# Patient Record
Sex: MALE | Race: WHITE
[De-identification: names, ages, dates, MRNs, and addresses within clinical notes are randomized per-mention and may not be internally consistent; named-entity substitution may affect disease eponyms.]

---

## 2017-03-30 ENCOUNTER — HOSPITAL ENCOUNTER (OUTPATIENT)
Dept: HOSPITAL 83 - RAD | Age: 22
Discharge: HOME | End: 2017-03-30
Attending: CHIROPRACTOR
Payer: COMMERCIAL

## 2017-03-30 DIAGNOSIS — M22.2X2: ICD-10-CM

## 2017-03-30 DIAGNOSIS — M25.471: Primary | ICD-10-CM

## 2017-04-06 ENCOUNTER — HOSPITAL ENCOUNTER (OUTPATIENT)
Dept: HOSPITAL 83 - LAB | Age: 22
Discharge: HOME | End: 2017-04-06
Attending: FAMILY MEDICINE
Payer: COMMERCIAL

## 2017-04-06 DIAGNOSIS — E16.2: ICD-10-CM

## 2017-04-06 DIAGNOSIS — M25.50: ICD-10-CM

## 2017-04-06 DIAGNOSIS — R10.9: ICD-10-CM

## 2017-04-06 DIAGNOSIS — Z13.220: Primary | ICD-10-CM

## 2017-04-06 LAB
ALBUMIN SERPL-MCNC: 4.2 GM/DL (ref 3.1–4.5)
ALP SERPL-CCNC: 69 U/L (ref 45–117)
ALT SERPL W P-5'-P-CCNC: 40 U/L (ref 12–78)
AST SERPL-CCNC: 17 IU/L (ref 3–35)
BUN SERPL-MCNC: 14 MG/DL (ref 7–24)
CHLORIDE SERPL-SCNC: 105 MMOL/L (ref 98–107)
CHOLEST SERPL-MCNC: 104 MG/DL (ref ?–200)
CK SERPL-CCNC: 104 U/L (ref 39–308)
CO2 SERPL-SCNC: 28 MMOL/L (ref 21–32)
ERYTHROCYTE [DISTWIDTH] IN BLOOD BY AUTOMATED COUNT: 12.7 % (ref 0–14.5)
EST. AVERAGE GLUCOSE BLD GHB EST-MCNC: 97 MG/DL
GLUCOSE SERPL-MCNC: 100 MG/DL (ref 65–99)
HCT VFR BLD AUTO: 45.2 % (ref 42–52)
HDLC SERPL-MCNC: 52 MG/DL (ref 40–60)
HGB BLD-MCNC: 15.7 G/DL (ref 14–18)
LDLC SERPL DIRECT ASSAY-MCNC: 33 MG/DL (ref 9–159)
MCH RBC QN AUTO: 29.1 PG (ref 27–31)
MCHC RBC AUTO-ENTMCNC: 34.7 G/DL (ref 33–37)
MCV RBC AUTO: 83.9 FL (ref 80–94)
NRBC BLD QL AUTO: 0 10*3/UL (ref 0–0)
PLATELET # BLD AUTO: 98 10*3/UL (ref 130–400)
PMV BLD AUTO: 11.8 FL (ref 9.6–12.3)
POTASSIUM SERPL-SCNC: 3.8 MMOL/L (ref 3.5–5.1)
PROT SERPL-MCNC: 7.8 GM/DL (ref 6.4–8.2)
RBC # BLD AUTO: 5.39 10*6/UL (ref 4.5–5.9)
SODIUM SERPL-SCNC: 141 MMOL/L (ref 136–145)
TRIGL SERPL-MCNC: 97 MG/DL (ref ?–150)
VLDLC SERPL CALC-MCNC: 19 MG/DL (ref 6–40)
WBC NRBC COR # BLD AUTO: 7.2 10*3/UL (ref 4.8–10.8)

## 2017-04-07 LAB
LYME AB/TOTAL IMMUNOGLOBULINS: <0.91 ISR (ref 0–0.9)
RHEUMATOID FACT SERPL-ACNC: <10 IU/ML (ref 0–13.9)

## 2017-06-13 ENCOUNTER — HOSPITAL ENCOUNTER (EMERGENCY)
Dept: HOSPITAL 83 - ED | Age: 22
Discharge: HOME | End: 2017-06-13
Payer: COMMERCIAL

## 2017-06-13 VITALS — WEIGHT: 225 LBS | BODY MASS INDEX: 33.33 KG/M2 | HEIGHT: 68.98 IN

## 2017-06-13 VITALS — DIASTOLIC BLOOD PRESSURE: 64 MMHG
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
## 2017-06-13 DIAGNOSIS — D69.3: ICD-10-CM
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
## 2017-06-13 DIAGNOSIS — S86.912A: Primary | ICD-10-CM

## 2017-06-13 DIAGNOSIS — Z79.899: ICD-10-CM
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
## 2017-06-13 DIAGNOSIS — S99.922A: ICD-10-CM

## 2017-08-04 ENCOUNTER — HOSPITAL ENCOUNTER (EMERGENCY)
Dept: HOSPITAL 83 - ED | Age: 22
Discharge: HOME | End: 2017-08-04
Payer: COMMERCIAL

## 2017-08-04 VITALS — WEIGHT: 230 LBS | BODY MASS INDEX: 34.07 KG/M2 | HEIGHT: 68.98 IN

## 2017-08-04 VITALS — SYSTOLIC BLOOD PRESSURE: 125 MMHG | DIASTOLIC BLOOD PRESSURE: 87 MMHG
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
## 2017-08-04 DIAGNOSIS — S16.1XXA: Primary | ICD-10-CM
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
## 2018-07-31 ENCOUNTER — HOSPITAL ENCOUNTER (OUTPATIENT)
Dept: HOSPITAL 83 - RAD | Age: 23
Discharge: HOME | End: 2018-07-31
Attending: FAMILY MEDICINE
Payer: COMMERCIAL

## 2018-07-31 DIAGNOSIS — M79.671: Primary | ICD-10-CM

## 2020-05-30 ENCOUNTER — APPOINTMENT (OUTPATIENT)
Dept: GENERAL RADIOLOGY | Age: 25
DRG: 515 | End: 2020-05-30
Payer: COMMERCIAL

## 2020-05-30 ENCOUNTER — APPOINTMENT (OUTPATIENT)
Dept: CT IMAGING | Age: 25
DRG: 515 | End: 2020-05-30
Payer: COMMERCIAL

## 2020-05-30 ENCOUNTER — HOSPITAL ENCOUNTER (INPATIENT)
Age: 25
LOS: 2 days | Discharge: HOME | DRG: 515 | End: 2020-06-01
Attending: EMERGENCY MEDICINE | Admitting: SURGERY
Payer: COMMERCIAL

## 2020-05-30 PROBLEM — T14.90XA TRAUMA: Status: ACTIVE | Noted: 2020-05-30

## 2020-05-30 LAB
ABO/RH: NORMAL
ACETAMINOPHEN LEVEL: <5 MCG/ML (ref 10–30)
ALBUMIN SERPL-MCNC: 4.6 G/DL (ref 3.5–5.2)
ALP BLD-CCNC: 59 U/L (ref 40–129)
ALT SERPL-CCNC: 34 U/L (ref 0–40)
ANION GAP SERPL CALCULATED.3IONS-SCNC: 16 MMOL/L (ref 7–16)
ANTIBODY SCREEN: NORMAL
APTT: 28 SEC (ref 24.5–35.1)
AST SERPL-CCNC: 27 U/L (ref 0–39)
B.E.: -2 MMOL/L (ref -3–3)
BILIRUB SERPL-MCNC: 0.4 MG/DL (ref 0–1.2)
BUN BLDV-MCNC: 12 MG/DL (ref 6–20)
CALCIUM SERPL-MCNC: 9 MG/DL (ref 8.6–10.2)
CHLORIDE BLD-SCNC: 102 MMOL/L (ref 98–107)
CO2: 23 MMOL/L (ref 22–29)
COHB: 0.4 % (ref 0–1.5)
CREAT SERPL-MCNC: 1 MG/DL (ref 0.7–1.2)
CRITICAL: ABNORMAL
DATE ANALYZED: ABNORMAL
DATE OF COLLECTION: ABNORMAL
ETHANOL: 72 MG/DL (ref 0–0.08)
GFR AFRICAN AMERICAN: >60
GFR NON-AFRICAN AMERICAN: >60 ML/MIN/1.73
GLUCOSE BLD-MCNC: 108 MG/DL (ref 74–99)
HCO3: 21.2 MMOL/L (ref 22–26)
HCT VFR BLD CALC: 46.9 % (ref 37–54)
HEMOGLOBIN: 16.1 G/DL (ref 12.5–16.5)
HHB: 0.8 % (ref 0–5)
INR BLD: 1
LAB: ABNORMAL
LACTIC ACID: 2.8 MMOL/L (ref 0.5–2.2)
Lab: ABNORMAL
MCH RBC QN AUTO: 29.4 PG (ref 26–35)
MCHC RBC AUTO-ENTMCNC: 34.3 % (ref 32–34.5)
MCV RBC AUTO: 85.7 FL (ref 80–99.9)
METHB: 0.4 % (ref 0–1.5)
MODE: ABNORMAL
O2 CONTENT: 23.1 ML/DL
O2 SATURATION: 99.2 % (ref 92–98.5)
O2HB: 98.4 % (ref 94–97)
OPERATOR ID: 366
PATIENT TEMP: 37 C
PCO2: 32.3 MMHG (ref 35–45)
PDW BLD-RTO: 12.3 FL (ref 11.5–15)
PH BLOOD GAS: 7.43 (ref 7.35–7.45)
PLATELET # BLD: 144 E9/L (ref 130–450)
PMV BLD AUTO: 11.6 FL (ref 7–12)
PO2: 348.8 MMHG (ref 75–100)
POTASSIUM SERPL-SCNC: 3.81 MMOL/L (ref 3.5–5)
POTASSIUM SERPL-SCNC: 3.9 MMOL/L (ref 3.5–5)
PROTHROMBIN TIME: 11.4 SEC (ref 9.3–12.4)
RBC # BLD: 5.47 E12/L (ref 3.8–5.8)
SALICYLATE, SERUM: <0.3 MG/DL (ref 0–30)
SODIUM BLD-SCNC: 141 MMOL/L (ref 132–146)
SOURCE, BLOOD GAS: ABNORMAL
THB: 16.1 G/DL (ref 11.5–16.5)
TIME ANALYZED: 2150
TOTAL PROTEIN: 7.7 G/DL (ref 6.4–8.3)
TRICYCLIC ANTIDEPRESSANTS SCREEN SERUM: NEGATIVE NG/ML
WBC # BLD: 12.6 E9/L (ref 4.5–11.5)

## 2020-05-30 PROCEDURE — 71045 X-RAY EXAM CHEST 1 VIEW: CPT

## 2020-05-30 PROCEDURE — 73130 X-RAY EXAM OF HAND: CPT

## 2020-05-30 PROCEDURE — 6360000002 HC RX W HCPCS: Performed by: SURGERY

## 2020-05-30 PROCEDURE — 6810039000 HC L1 TRAUMA ALERT

## 2020-05-30 PROCEDURE — 99223 1ST HOSP IP/OBS HIGH 75: CPT | Performed by: SURGERY

## 2020-05-30 PROCEDURE — 99254 IP/OBS CNSLTJ NEW/EST MOD 60: CPT | Performed by: ORTHOPAEDIC SURGERY

## 2020-05-30 PROCEDURE — 85730 THROMBOPLASTIN TIME PARTIAL: CPT

## 2020-05-30 PROCEDURE — 85610 PROTHROMBIN TIME: CPT

## 2020-05-30 PROCEDURE — 2060000000 HC ICU INTERMEDIATE R&B

## 2020-05-30 PROCEDURE — 73000 X-RAY EXAM OF COLLAR BONE: CPT

## 2020-05-30 PROCEDURE — 85027 COMPLETE CBC AUTOMATED: CPT

## 2020-05-30 PROCEDURE — 86850 RBC ANTIBODY SCREEN: CPT

## 2020-05-30 PROCEDURE — 12001 RPR S/N/AX/GEN/TRNK 2.5CM/<: CPT

## 2020-05-30 PROCEDURE — 72125 CT NECK SPINE W/O DYE: CPT

## 2020-05-30 PROCEDURE — 6360000002 HC RX W HCPCS: Performed by: STUDENT IN AN ORGANIZED HEALTH CARE EDUCATION/TRAINING PROGRAM

## 2020-05-30 PROCEDURE — 80307 DRUG TEST PRSMV CHEM ANLYZR: CPT

## 2020-05-30 PROCEDURE — 83605 ASSAY OF LACTIC ACID: CPT

## 2020-05-30 PROCEDURE — 72170 X-RAY EXAM OF PELVIS: CPT

## 2020-05-30 PROCEDURE — 96374 THER/PROPH/DIAG INJ IV PUSH: CPT

## 2020-05-30 PROCEDURE — 84132 ASSAY OF SERUM POTASSIUM: CPT

## 2020-05-30 PROCEDURE — 73562 X-RAY EXAM OF KNEE 3: CPT

## 2020-05-30 PROCEDURE — 6360000004 HC RX CONTRAST MEDICATION: Performed by: RADIOLOGY

## 2020-05-30 PROCEDURE — 26750 TREAT FINGER FRACTURE EACH: CPT | Performed by: ORTHOPAEDIC SURGERY

## 2020-05-30 PROCEDURE — 86900 BLOOD TYPING SEROLOGIC ABO: CPT

## 2020-05-30 PROCEDURE — 74177 CT ABD & PELVIS W/CONTRAST: CPT

## 2020-05-30 PROCEDURE — 0JQ03ZZ REPAIR SCALP SUBCUTANEOUS TISSUE AND FASCIA, PERCUTANEOUS APPROACH: ICD-10-PCS | Performed by: STUDENT IN AN ORGANIZED HEALTH CARE EDUCATION/TRAINING PROGRAM

## 2020-05-30 PROCEDURE — 96375 TX/PRO/DX INJ NEW DRUG ADDON: CPT

## 2020-05-30 PROCEDURE — 86901 BLOOD TYPING SEROLOGIC RH(D): CPT

## 2020-05-30 PROCEDURE — 71260 CT THORAX DX C+: CPT

## 2020-05-30 PROCEDURE — 70450 CT HEAD/BRAIN W/O DYE: CPT

## 2020-05-30 PROCEDURE — 36415 COLL VENOUS BLD VENIPUNCTURE: CPT

## 2020-05-30 PROCEDURE — G0480 DRUG TEST DEF 1-7 CLASSES: HCPCS

## 2020-05-30 PROCEDURE — 99285 EMERGENCY DEPT VISIT HI MDM: CPT

## 2020-05-30 PROCEDURE — 80053 COMPREHEN METABOLIC PANEL: CPT

## 2020-05-30 PROCEDURE — 2500000003 HC RX 250 WO HCPCS: Performed by: STUDENT IN AN ORGANIZED HEALTH CARE EDUCATION/TRAINING PROGRAM

## 2020-05-30 PROCEDURE — 82805 BLOOD GASES W/O2 SATURATION: CPT

## 2020-05-30 PROCEDURE — 2580000003 HC RX 258: Performed by: STUDENT IN AN ORGANIZED HEALTH CARE EDUCATION/TRAINING PROGRAM

## 2020-05-30 RX ORDER — LIDOCAINE HYDROCHLORIDE AND EPINEPHRINE 10; 10 MG/ML; UG/ML
20 INJECTION, SOLUTION INFILTRATION; PERINEURAL ONCE
Status: COMPLETED | OUTPATIENT
Start: 2020-05-30 | End: 2020-05-30

## 2020-05-30 RX ORDER — SODIUM CHLORIDE 9 MG/ML
1000 INJECTION, SOLUTION INTRAVENOUS ONCE
Status: COMPLETED | OUTPATIENT
Start: 2020-05-30 | End: 2020-05-30

## 2020-05-30 RX ORDER — LEVETIRACETAM 5 MG/ML
500 INJECTION INTRAVASCULAR EVERY 12 HOURS
Status: DISCONTINUED | OUTPATIENT
Start: 2020-05-30 | End: 2020-05-31

## 2020-05-30 RX ORDER — FENTANYL CITRATE 50 UG/ML
INJECTION, SOLUTION INTRAMUSCULAR; INTRAVENOUS DAILY PRN
Status: COMPLETED | OUTPATIENT
Start: 2020-05-30 | End: 2020-05-30

## 2020-05-30 RX ORDER — FENTANYL CITRATE 50 UG/ML
INJECTION, SOLUTION INTRAMUSCULAR; INTRAVENOUS
Status: DISPENSED
Start: 2020-05-30 | End: 2020-05-31

## 2020-05-30 RX ADMIN — FENTANYL CITRATE 50 MCG: 50 INJECTION, SOLUTION INTRAMUSCULAR; INTRAVENOUS at 21:48

## 2020-05-30 RX ADMIN — LIDOCAINE HYDROCHLORIDE,EPINEPHRINE BITARTRATE 20 ML: 10; .01 INJECTION, SOLUTION INFILTRATION; PERINEURAL at 22:30

## 2020-05-30 RX ADMIN — SODIUM CHLORIDE 1000 ML: 9 INJECTION, SOLUTION INTRAVENOUS at 23:00

## 2020-05-30 RX ADMIN — LEVETIRACETAM 500 MG: 5 INJECTION INTRAVENOUS at 23:00

## 2020-05-30 RX ADMIN — IOPAMIDOL 110 ML: 755 INJECTION, SOLUTION INTRAVENOUS at 22:20

## 2020-05-30 ASSESSMENT — PAIN SCALES - GENERAL: PAINLEVEL_OUTOF10: 0

## 2020-05-31 ENCOUNTER — ANESTHESIA EVENT (OUTPATIENT)
Dept: OPERATING ROOM | Age: 25
DRG: 515 | End: 2020-05-31
Payer: COMMERCIAL

## 2020-05-31 ENCOUNTER — ANESTHESIA (OUTPATIENT)
Dept: OPERATING ROOM | Age: 25
DRG: 515 | End: 2020-05-31
Payer: COMMERCIAL

## 2020-05-31 ENCOUNTER — APPOINTMENT (OUTPATIENT)
Dept: GENERAL RADIOLOGY | Age: 25
DRG: 515 | End: 2020-05-31
Payer: COMMERCIAL

## 2020-05-31 ENCOUNTER — APPOINTMENT (OUTPATIENT)
Dept: CT IMAGING | Age: 25
DRG: 515 | End: 2020-05-31
Payer: COMMERCIAL

## 2020-05-31 VITALS — TEMPERATURE: 97.9 F | DIASTOLIC BLOOD PRESSURE: 95 MMHG | SYSTOLIC BLOOD PRESSURE: 137 MMHG | OXYGEN SATURATION: 92 %

## 2020-05-31 PROBLEM — S62.522A: Status: ACTIVE | Noted: 2020-05-31

## 2020-05-31 PROBLEM — S42.001A CLOSED DISPLACED FRACTURE OF RIGHT CLAVICLE: Status: ACTIVE | Noted: 2020-05-31

## 2020-05-31 PROBLEM — I60.9 SAH (SUBARACHNOID HEMORRHAGE) (HCC): Status: ACTIVE | Noted: 2020-05-31

## 2020-05-31 LAB
ANION GAP SERPL CALCULATED.3IONS-SCNC: 15 MMOL/L (ref 7–16)
BUN BLDV-MCNC: 15 MG/DL (ref 6–20)
CALCIUM SERPL-MCNC: 8.7 MG/DL (ref 8.6–10.2)
CHLORIDE BLD-SCNC: 102 MMOL/L (ref 98–107)
CO2: 22 MMOL/L (ref 22–29)
CREAT SERPL-MCNC: 0.8 MG/DL (ref 0.7–1.2)
GFR AFRICAN AMERICAN: >60
GFR NON-AFRICAN AMERICAN: >60 ML/MIN/1.73
GLUCOSE BLD-MCNC: 133 MG/DL (ref 74–99)
HCT VFR BLD CALC: 44.2 % (ref 37–54)
HEMOGLOBIN: 14.9 G/DL (ref 12.5–16.5)
LACTIC ACID: 2.2 MMOL/L (ref 0.5–2.2)
MCH RBC QN AUTO: 29 PG (ref 26–35)
MCHC RBC AUTO-ENTMCNC: 33.7 % (ref 32–34.5)
MCV RBC AUTO: 86.2 FL (ref 80–99.9)
PDW BLD-RTO: 12.5 FL (ref 11.5–15)
PLATELET # BLD: 128 E9/L (ref 130–450)
PMV BLD AUTO: 11.5 FL (ref 7–12)
POTASSIUM REFLEX MAGNESIUM: 4 MMOL/L (ref 3.5–5)
RBC # BLD: 5.13 E12/L (ref 3.8–5.8)
SARS-COV-2, NAAT: NOT DETECTED
SODIUM BLD-SCNC: 139 MMOL/L (ref 132–146)
WBC # BLD: 14.8 E9/L (ref 4.5–11.5)

## 2020-05-31 PROCEDURE — C1713 ANCHOR/SCREW BN/BN,TIS/BN: HCPCS | Performed by: ORTHOPAEDIC SURGERY

## 2020-05-31 PROCEDURE — 2709999900 HC NON-CHARGEABLE SUPPLY: Performed by: ORTHOPAEDIC SURGERY

## 2020-05-31 PROCEDURE — 6360000002 HC RX W HCPCS: Performed by: STUDENT IN AN ORGANIZED HEALTH CARE EDUCATION/TRAINING PROGRAM

## 2020-05-31 PROCEDURE — 2580000003 HC RX 258: Performed by: RADIOLOGY

## 2020-05-31 PROCEDURE — 3700000000 HC ANESTHESIA ATTENDED CARE: Performed by: ORTHOPAEDIC SURGERY

## 2020-05-31 PROCEDURE — 2700000000 HC OXYGEN THERAPY PER DAY

## 2020-05-31 PROCEDURE — 6370000000 HC RX 637 (ALT 250 FOR IP): Performed by: STUDENT IN AN ORGANIZED HEALTH CARE EDUCATION/TRAINING PROGRAM

## 2020-05-31 PROCEDURE — 3700000001 HC ADD 15 MINUTES (ANESTHESIA): Performed by: ORTHOPAEDIC SURGERY

## 2020-05-31 PROCEDURE — 3209999900 FLUORO FOR SURGICAL PROCEDURES

## 2020-05-31 PROCEDURE — 6360000002 HC RX W HCPCS

## 2020-05-31 PROCEDURE — 6370000000 HC RX 637 (ALT 250 FOR IP): Performed by: SURGERY

## 2020-05-31 PROCEDURE — 3600000003 HC SURGERY LEVEL 3 BASE: Performed by: ORTHOPAEDIC SURGERY

## 2020-05-31 PROCEDURE — 6360000002 HC RX W HCPCS: Performed by: ANESTHESIOLOGY

## 2020-05-31 PROCEDURE — 2500000003 HC RX 250 WO HCPCS: Performed by: ORTHOPAEDIC SURGERY

## 2020-05-31 PROCEDURE — 2500000003 HC RX 250 WO HCPCS

## 2020-05-31 PROCEDURE — 80048 BASIC METABOLIC PNL TOTAL CA: CPT

## 2020-05-31 PROCEDURE — 3600000013 HC SURGERY LEVEL 3 ADDTL 15MIN: Performed by: ORTHOPAEDIC SURGERY

## 2020-05-31 PROCEDURE — U0002 COVID-19 LAB TEST NON-CDC: HCPCS

## 2020-05-31 PROCEDURE — 2580000003 HC RX 258: Performed by: STUDENT IN AN ORGANIZED HEALTH CARE EDUCATION/TRAINING PROGRAM

## 2020-05-31 PROCEDURE — 2060000000 HC ICU INTERMEDIATE R&B

## 2020-05-31 PROCEDURE — 36415 COLL VENOUS BLD VENIPUNCTURE: CPT

## 2020-05-31 PROCEDURE — 0PS904Z REPOSITION RIGHT CLAVICLE WITH INTERNAL FIXATION DEVICE, OPEN APPROACH: ICD-10-PCS | Performed by: ORTHOPAEDIC SURGERY

## 2020-05-31 PROCEDURE — 7100000000 HC PACU RECOVERY - FIRST 15 MIN: Performed by: ORTHOPAEDIC SURGERY

## 2020-05-31 PROCEDURE — 6360000004 HC RX CONTRAST MEDICATION: Performed by: RADIOLOGY

## 2020-05-31 PROCEDURE — 23515 OPTX CLAVICULAR FX W/INT FIX: CPT | Performed by: ORTHOPAEDIC SURGERY

## 2020-05-31 PROCEDURE — 83605 ASSAY OF LACTIC ACID: CPT

## 2020-05-31 PROCEDURE — 99233 SBSQ HOSP IP/OBS HIGH 50: CPT | Performed by: SURGERY

## 2020-05-31 PROCEDURE — 85027 COMPLETE CBC AUTOMATED: CPT

## 2020-05-31 PROCEDURE — 99222 1ST HOSP IP/OBS MODERATE 55: CPT | Performed by: NEUROLOGICAL SURGERY

## 2020-05-31 PROCEDURE — 2720000010 HC SURG SUPPLY STERILE: Performed by: ORTHOPAEDIC SURGERY

## 2020-05-31 PROCEDURE — 73000 X-RAY EXAM OF COLLAR BONE: CPT

## 2020-05-31 PROCEDURE — 7100000001 HC PACU RECOVERY - ADDTL 15 MIN: Performed by: ORTHOPAEDIC SURGERY

## 2020-05-31 PROCEDURE — 70496 CT ANGIOGRAPHY HEAD: CPT

## 2020-05-31 DEVICE — SCREW BNE L16MM DIA2.7MM CORT S STL ST LOK FULL THRD T8: Type: IMPLANTABLE DEVICE | Site: CLAVICLE | Status: FUNCTIONAL

## 2020-05-31 DEVICE — IMPLANTABLE DEVICE: Type: IMPLANTABLE DEVICE | Status: FUNCTIONAL

## 2020-05-31 DEVICE — SCREW BNE L20MM DIA3.5MM CORT S STL ST NONCANNULATED LOK: Type: IMPLANTABLE DEVICE | Site: CLAVICLE | Status: FUNCTIONAL

## 2020-05-31 DEVICE — SCREW BNE L14MM DIA3.5MM CORT S STL ST LOK FULL THRD: Type: IMPLANTABLE DEVICE | Site: CLAVICLE | Status: FUNCTIONAL

## 2020-05-31 DEVICE — SCREW CORTX SLFTP FTHRD 3.5X18MM: Type: IMPLANTABLE DEVICE | Site: CLAVICLE | Status: FUNCTIONAL

## 2020-05-31 DEVICE — SCREW BNE L26MM DIA2.7MM CORT S STL ST FULL THRD FOR SM: Type: IMPLANTABLE DEVICE | Site: CLAVICLE | Status: FUNCTIONAL

## 2020-05-31 RX ORDER — ACETAMINOPHEN 325 MG/1
650 TABLET ORAL EVERY 4 HOURS PRN
Status: DISCONTINUED | OUTPATIENT
Start: 2020-05-31 | End: 2020-06-01 | Stop reason: HOSPADM

## 2020-05-31 RX ORDER — SODIUM CHLORIDE 0.9 % (FLUSH) 0.9 %
10 SYRINGE (ML) INJECTION EVERY 12 HOURS SCHEDULED
Status: DISCONTINUED | OUTPATIENT
Start: 2020-05-31 | End: 2020-05-31 | Stop reason: SDUPTHER

## 2020-05-31 RX ORDER — LEVETIRACETAM 500 MG/1
500 TABLET ORAL 2 TIMES DAILY
Status: DISCONTINUED | OUTPATIENT
Start: 2020-05-31 | End: 2020-06-01 | Stop reason: HOSPADM

## 2020-05-31 RX ORDER — BUTALBITAL, ACETAMINOPHEN AND CAFFEINE 50; 325; 40 MG/1; MG/1; MG/1
1 TABLET ORAL EVERY 4 HOURS PRN
Status: DISCONTINUED | OUTPATIENT
Start: 2020-05-31 | End: 2020-06-01 | Stop reason: HOSPADM

## 2020-05-31 RX ORDER — MEPERIDINE HYDROCHLORIDE 25 MG/ML
12.5 INJECTION INTRAMUSCULAR; INTRAVENOUS; SUBCUTANEOUS EVERY 5 MIN PRN
Status: DISCONTINUED | OUTPATIENT
Start: 2020-05-31 | End: 2020-05-31 | Stop reason: HOSPADM

## 2020-05-31 RX ORDER — PROMETHAZINE HYDROCHLORIDE 25 MG/ML
25 INJECTION, SOLUTION INTRAMUSCULAR; INTRAVENOUS PRN
Status: DISCONTINUED | OUTPATIENT
Start: 2020-05-31 | End: 2020-05-31 | Stop reason: HOSPADM

## 2020-05-31 RX ORDER — ONDANSETRON 2 MG/ML
INJECTION INTRAMUSCULAR; INTRAVENOUS PRN
Status: DISCONTINUED | OUTPATIENT
Start: 2020-05-31 | End: 2020-05-31 | Stop reason: SDUPTHER

## 2020-05-31 RX ORDER — LIDOCAINE HYDROCHLORIDE 20 MG/ML
INJECTION, SOLUTION INTRAVENOUS PRN
Status: DISCONTINUED | OUTPATIENT
Start: 2020-05-31 | End: 2020-05-31 | Stop reason: SDUPTHER

## 2020-05-31 RX ORDER — FENTANYL CITRATE 50 UG/ML
INJECTION, SOLUTION INTRAMUSCULAR; INTRAVENOUS PRN
Status: DISCONTINUED | OUTPATIENT
Start: 2020-05-31 | End: 2020-05-31 | Stop reason: SDUPTHER

## 2020-05-31 RX ORDER — SODIUM CHLORIDE 0.9 % (FLUSH) 0.9 %
10 SYRINGE (ML) INJECTION PRN
Status: DISCONTINUED | OUTPATIENT
Start: 2020-05-31 | End: 2020-05-31 | Stop reason: SDUPTHER

## 2020-05-31 RX ORDER — MIDAZOLAM HYDROCHLORIDE 1 MG/ML
INJECTION INTRAMUSCULAR; INTRAVENOUS PRN
Status: DISCONTINUED | OUTPATIENT
Start: 2020-05-31 | End: 2020-05-31 | Stop reason: SDUPTHER

## 2020-05-31 RX ORDER — SODIUM CHLORIDE 0.9 % (FLUSH) 0.9 %
10 SYRINGE (ML) INJECTION PRN
Status: DISCONTINUED | OUTPATIENT
Start: 2020-05-31 | End: 2020-06-01 | Stop reason: HOSPADM

## 2020-05-31 RX ORDER — OXYCODONE HYDROCHLORIDE AND ACETAMINOPHEN 5; 325 MG/1; MG/1
2 TABLET ORAL EVERY 4 HOURS PRN
Status: DISCONTINUED | OUTPATIENT
Start: 2020-05-31 | End: 2020-06-01 | Stop reason: HOSPADM

## 2020-05-31 RX ORDER — POLYETHYLENE GLYCOL 3350 17 G/17G
17 POWDER, FOR SOLUTION ORAL DAILY
Status: DISCONTINUED | OUTPATIENT
Start: 2020-05-31 | End: 2020-06-01 | Stop reason: HOSPADM

## 2020-05-31 RX ORDER — SCOLOPAMINE TRANSDERMAL SYSTEM 1 MG/1
1 PATCH, EXTENDED RELEASE TRANSDERMAL
Status: DISCONTINUED | OUTPATIENT
Start: 2020-05-31 | End: 2020-06-01 | Stop reason: HOSPADM

## 2020-05-31 RX ORDER — NEOSTIGMINE METHYLSULFATE 1 MG/ML
INJECTION, SOLUTION INTRAVENOUS PRN
Status: DISCONTINUED | OUTPATIENT
Start: 2020-05-31 | End: 2020-05-31 | Stop reason: SDUPTHER

## 2020-05-31 RX ORDER — LABETALOL HYDROCHLORIDE 5 MG/ML
5 INJECTION, SOLUTION INTRAVENOUS EVERY 10 MIN PRN
Status: DISCONTINUED | OUTPATIENT
Start: 2020-05-31 | End: 2020-05-31 | Stop reason: HOSPADM

## 2020-05-31 RX ORDER — GLYCOPYRROLATE 1 MG/5 ML
SYRINGE (ML) INTRAVENOUS PRN
Status: DISCONTINUED | OUTPATIENT
Start: 2020-05-31 | End: 2020-05-31 | Stop reason: SDUPTHER

## 2020-05-31 RX ORDER — SODIUM CHLORIDE 0.9 % (FLUSH) 0.9 %
10 SYRINGE (ML) INJECTION EVERY 12 HOURS SCHEDULED
Status: DISCONTINUED | OUTPATIENT
Start: 2020-05-31 | End: 2020-06-01 | Stop reason: HOSPADM

## 2020-05-31 RX ORDER — DIAPER,BRIEF,INFANT-TODD,DISP
EACH MISCELLANEOUS 3 TIMES DAILY
Status: DISCONTINUED | OUTPATIENT
Start: 2020-05-31 | End: 2020-06-01 | Stop reason: HOSPADM

## 2020-05-31 RX ORDER — SENNA AND DOCUSATE SODIUM 50; 8.6 MG/1; MG/1
1 TABLET, FILM COATED ORAL 2 TIMES DAILY
Status: DISCONTINUED | OUTPATIENT
Start: 2020-05-31 | End: 2020-06-01 | Stop reason: HOSPADM

## 2020-05-31 RX ORDER — SODIUM CHLORIDE 9 MG/ML
INJECTION, SOLUTION INTRAVENOUS CONTINUOUS
Status: DISCONTINUED | OUTPATIENT
Start: 2020-05-31 | End: 2020-05-31

## 2020-05-31 RX ORDER — ROCURONIUM BROMIDE 10 MG/ML
INJECTION, SOLUTION INTRAVENOUS PRN
Status: DISCONTINUED | OUTPATIENT
Start: 2020-05-31 | End: 2020-05-31 | Stop reason: SDUPTHER

## 2020-05-31 RX ORDER — OXYCODONE HYDROCHLORIDE AND ACETAMINOPHEN 5; 325 MG/1; MG/1
1 TABLET ORAL EVERY 4 HOURS PRN
Status: DISCONTINUED | OUTPATIENT
Start: 2020-05-31 | End: 2020-06-01 | Stop reason: HOSPADM

## 2020-05-31 RX ORDER — ONDANSETRON 2 MG/ML
4 INJECTION INTRAMUSCULAR; INTRAVENOUS EVERY 6 HOURS PRN
Status: DISCONTINUED | OUTPATIENT
Start: 2020-05-31 | End: 2020-06-01 | Stop reason: HOSPADM

## 2020-05-31 RX ORDER — CEFAZOLIN SODIUM 2 G/50ML
2 SOLUTION INTRAVENOUS SEE ADMIN INSTRUCTIONS
Status: COMPLETED | OUTPATIENT
Start: 2020-05-31 | End: 2020-05-31

## 2020-05-31 RX ORDER — PROPOFOL 10 MG/ML
INJECTION, EMULSION INTRAVENOUS PRN
Status: DISCONTINUED | OUTPATIENT
Start: 2020-05-31 | End: 2020-05-31 | Stop reason: SDUPTHER

## 2020-05-31 RX ORDER — PROMETHAZINE HYDROCHLORIDE 25 MG/1
12.5 TABLET ORAL EVERY 6 HOURS PRN
Status: DISCONTINUED | OUTPATIENT
Start: 2020-05-31 | End: 2020-06-01 | Stop reason: HOSPADM

## 2020-05-31 RX ORDER — SODIUM CHLORIDE 0.9 % (FLUSH) 0.9 %
10 SYRINGE (ML) INJECTION PRN
Status: COMPLETED | OUTPATIENT
Start: 2020-05-31 | End: 2020-05-31

## 2020-05-31 RX ORDER — DEXAMETHASONE SODIUM PHOSPHATE 10 MG/ML
INJECTION INTRAMUSCULAR; INTRAVENOUS PRN
Status: DISCONTINUED | OUTPATIENT
Start: 2020-05-31 | End: 2020-05-31 | Stop reason: SDUPTHER

## 2020-05-31 RX ORDER — CEFAZOLIN SODIUM 2 G/50ML
2 SOLUTION INTRAVENOUS EVERY 8 HOURS
Status: COMPLETED | OUTPATIENT
Start: 2020-05-31 | End: 2020-06-01

## 2020-05-31 RX ORDER — METHOCARBAMOL 500 MG/1
1000 TABLET, FILM COATED ORAL 4 TIMES DAILY
Status: DISCONTINUED | OUTPATIENT
Start: 2020-05-31 | End: 2020-06-01 | Stop reason: HOSPADM

## 2020-05-31 RX ORDER — LIDOCAINE HYDROCHLORIDE AND EPINEPHRINE 10; 10 MG/ML; UG/ML
INJECTION, SOLUTION INFILTRATION; PERINEURAL PRN
Status: DISCONTINUED | OUTPATIENT
Start: 2020-05-31 | End: 2020-05-31 | Stop reason: ALTCHOICE

## 2020-05-31 RX ADMIN — FENTANYL CITRATE 50 MCG: 50 INJECTION, SOLUTION INTRAMUSCULAR; INTRAVENOUS at 09:30

## 2020-05-31 RX ADMIN — FENTANYL CITRATE 100 MCG: 50 INJECTION, SOLUTION INTRAMUSCULAR; INTRAVENOUS at 09:04

## 2020-05-31 RX ADMIN — Medication 10 ML: at 03:07

## 2020-05-31 RX ADMIN — OXYCODONE HYDROCHLORIDE AND ACETAMINOPHEN 2 TABLET: 5; 325 TABLET ORAL at 12:51

## 2020-05-31 RX ADMIN — FENTANYL CITRATE 50 MCG: 50 INJECTION, SOLUTION INTRAMUSCULAR; INTRAVENOUS at 09:27

## 2020-05-31 RX ADMIN — SENNOSIDES AND DOCUSATE SODIUM 1 TABLET: 8.6; 5 TABLET ORAL at 07:50

## 2020-05-31 RX ADMIN — METHOCARBAMOL TABLETS 1000 MG: 500 TABLET, COATED ORAL at 07:50

## 2020-05-31 RX ADMIN — Medication 0.6 MG: at 10:34

## 2020-05-31 RX ADMIN — METHOCARBAMOL TABLETS 1000 MG: 500 TABLET, COATED ORAL at 21:57

## 2020-05-31 RX ADMIN — BACITRACIN ZINC: 500 OINTMENT TOPICAL at 07:50

## 2020-05-31 RX ADMIN — LEVETIRACETAM 500 MG: 5 INJECTION INTRAVENOUS at 12:44

## 2020-05-31 RX ADMIN — HYDROMORPHONE HYDROCHLORIDE 0.5 MG: 1 INJECTION, SOLUTION INTRAMUSCULAR; INTRAVENOUS; SUBCUTANEOUS at 01:01

## 2020-05-31 RX ADMIN — ONDANSETRON 4 MG: 2 INJECTION INTRAMUSCULAR; INTRAVENOUS at 07:50

## 2020-05-31 RX ADMIN — FENTANYL CITRATE 50 MCG: 50 INJECTION, SOLUTION INTRAMUSCULAR; INTRAVENOUS at 10:42

## 2020-05-31 RX ADMIN — ONDANSETRON 4 MG: 2 INJECTION INTRAMUSCULAR; INTRAVENOUS at 01:01

## 2020-05-31 RX ADMIN — IOPAMIDOL 70 ML: 755 INJECTION, SOLUTION INTRAVENOUS at 03:06

## 2020-05-31 RX ADMIN — ONDANSETRON HYDROCHLORIDE 4 MG: 2 INJECTION, SOLUTION INTRAMUSCULAR; INTRAVENOUS at 10:19

## 2020-05-31 RX ADMIN — BACITRACIN ZINC: 500 OINTMENT TOPICAL at 12:44

## 2020-05-31 RX ADMIN — Medication 3 MG: at 10:34

## 2020-05-31 RX ADMIN — LIDOCAINE HYDROCHLORIDE 80 MG: 20 INJECTION, SOLUTION INTRAVENOUS at 09:04

## 2020-05-31 RX ADMIN — CEFAZOLIN SODIUM 2 G: 2 SOLUTION INTRAVENOUS at 16:08

## 2020-05-31 RX ADMIN — BACITRACIN ZINC: 500 OINTMENT TOPICAL at 21:57

## 2020-05-31 RX ADMIN — ROCURONIUM BROMIDE 50 MG: 10 INJECTION, SOLUTION INTRAVENOUS at 09:04

## 2020-05-31 RX ADMIN — Medication 10 ML: at 21:57

## 2020-05-31 RX ADMIN — SODIUM CHLORIDE, PRESERVATIVE FREE 10 ML: 5 INJECTION INTRAVENOUS at 07:49

## 2020-05-31 RX ADMIN — SENNOSIDES AND DOCUSATE SODIUM 1 TABLET: 8.6; 5 TABLET ORAL at 21:56

## 2020-05-31 RX ADMIN — PROPOFOL 200 MG: 10 INJECTION, EMULSION INTRAVENOUS at 09:04

## 2020-05-31 RX ADMIN — ACETAMINOPHEN 650 MG: 325 TABLET, FILM COATED ORAL at 07:50

## 2020-05-31 RX ADMIN — SODIUM CHLORIDE: 9 INJECTION, SOLUTION INTRAVENOUS at 10:19

## 2020-05-31 RX ADMIN — DEXAMETHASONE SODIUM PHOSPHATE 10 MG: 10 INJECTION INTRAMUSCULAR; INTRAVENOUS at 09:04

## 2020-05-31 RX ADMIN — METHOCARBAMOL TABLETS 1000 MG: 500 TABLET, COATED ORAL at 16:08

## 2020-05-31 RX ADMIN — CEFAZOLIN SODIUM 2 G: 2 SOLUTION INTRAVENOUS at 09:07

## 2020-05-31 RX ADMIN — SODIUM CHLORIDE: 9 INJECTION, SOLUTION INTRAVENOUS at 01:02

## 2020-05-31 RX ADMIN — SODIUM CHLORIDE: 9 INJECTION, SOLUTION INTRAVENOUS at 09:01

## 2020-05-31 RX ADMIN — HYDROMORPHONE HYDROCHLORIDE 0.25 MG: 1 INJECTION, SOLUTION INTRAMUSCULAR; INTRAVENOUS; SUBCUTANEOUS at 11:55

## 2020-05-31 RX ADMIN — LEVETIRACETAM 500 MG: 500 TABLET ORAL at 21:55

## 2020-05-31 RX ADMIN — ROCURONIUM BROMIDE 30 MG: 10 INJECTION, SOLUTION INTRAVENOUS at 09:32

## 2020-05-31 RX ADMIN — MIDAZOLAM 2 MG: 1 INJECTION INTRAMUSCULAR; INTRAVENOUS at 09:02

## 2020-05-31 ASSESSMENT — PULMONARY FUNCTION TESTS
PIF_VALUE: 27
PIF_VALUE: 24
PIF_VALUE: 25
PIF_VALUE: 26
PIF_VALUE: 24
PIF_VALUE: 27
PIF_VALUE: 25
PIF_VALUE: 26
PIF_VALUE: 12
PIF_VALUE: 25
PIF_VALUE: 16
PIF_VALUE: 3
PIF_VALUE: 25
PIF_VALUE: 25
PIF_VALUE: 24
PIF_VALUE: 25
PIF_VALUE: 25
PIF_VALUE: 23
PIF_VALUE: 25
PIF_VALUE: 23
PIF_VALUE: 13
PIF_VALUE: 25
PIF_VALUE: 2
PIF_VALUE: 24
PIF_VALUE: 12
PIF_VALUE: 24
PIF_VALUE: 2
PIF_VALUE: 22
PIF_VALUE: 25
PIF_VALUE: 26
PIF_VALUE: 3
PIF_VALUE: 25
PIF_VALUE: 25
PIF_VALUE: 4
PIF_VALUE: 25
PIF_VALUE: 2
PIF_VALUE: 25
PIF_VALUE: 1
PIF_VALUE: 25
PIF_VALUE: 4
PIF_VALUE: 25
PIF_VALUE: 3
PIF_VALUE: 23
PIF_VALUE: 23
PIF_VALUE: 24
PIF_VALUE: 24
PIF_VALUE: 25
PIF_VALUE: 37
PIF_VALUE: 25
PIF_VALUE: 4
PIF_VALUE: 5
PIF_VALUE: 24
PIF_VALUE: 16
PIF_VALUE: 25
PIF_VALUE: 25
PIF_VALUE: 2
PIF_VALUE: 23
PIF_VALUE: 11
PIF_VALUE: 25
PIF_VALUE: 23
PIF_VALUE: 25
PIF_VALUE: 25
PIF_VALUE: 4
PIF_VALUE: 4
PIF_VALUE: 25
PIF_VALUE: 26
PIF_VALUE: 25
PIF_VALUE: 25
PIF_VALUE: 24
PIF_VALUE: 25
PIF_VALUE: 6
PIF_VALUE: 22
PIF_VALUE: 25
PIF_VALUE: 26
PIF_VALUE: 25
PIF_VALUE: 26
PIF_VALUE: 24
PIF_VALUE: 23
PIF_VALUE: 3
PIF_VALUE: 25
PIF_VALUE: 4
PIF_VALUE: 14
PIF_VALUE: 25
PIF_VALUE: 25
PIF_VALUE: 21
PIF_VALUE: 11
PIF_VALUE: 25
PIF_VALUE: 24
PIF_VALUE: 26

## 2020-05-31 ASSESSMENT — PAIN DESCRIPTION - PROGRESSION
CLINICAL_PROGRESSION: GRADUALLY WORSENING
CLINICAL_PROGRESSION: NOT CHANGED
CLINICAL_PROGRESSION: GRADUALLY WORSENING

## 2020-05-31 ASSESSMENT — PAIN DESCRIPTION - FREQUENCY
FREQUENCY: INTERMITTENT
FREQUENCY: CONTINUOUS
FREQUENCY: CONTINUOUS

## 2020-05-31 ASSESSMENT — PAIN DESCRIPTION - LOCATION
LOCATION: SHOULDER
LOCATION: SHOULDER
LOCATION: HEAD

## 2020-05-31 ASSESSMENT — PAIN DESCRIPTION - ORIENTATION
ORIENTATION: RIGHT
ORIENTATION: RIGHT
ORIENTATION: ANTERIOR

## 2020-05-31 ASSESSMENT — ENCOUNTER SYMPTOMS
ALLERGIC/IMMUNOLOGIC NEGATIVE: 1
EYES NEGATIVE: 1
RESPIRATORY NEGATIVE: 1
NAUSEA: 1

## 2020-05-31 ASSESSMENT — PAIN DESCRIPTION - DESCRIPTORS
DESCRIPTORS: HEADACHE
DESCRIPTORS: BURNING;CONSTANT;DISCOMFORT
DESCRIPTORS: BURNING;DISCOMFORT

## 2020-05-31 ASSESSMENT — PAIN SCALES - GENERAL
PAINLEVEL_OUTOF10: 4
PAINLEVEL_OUTOF10: 10
PAINLEVEL_OUTOF10: 4
PAINLEVEL_OUTOF10: 0
PAINLEVEL_OUTOF10: 8
PAINLEVEL_OUTOF10: 4

## 2020-05-31 ASSESSMENT — PAIN DESCRIPTION - PAIN TYPE
TYPE: ACUTE PAIN
TYPE: SURGICAL PAIN
TYPE: SURGICAL PAIN

## 2020-05-31 ASSESSMENT — PAIN - FUNCTIONAL ASSESSMENT
PAIN_FUNCTIONAL_ASSESSMENT: PREVENTS OR INTERFERES SOME ACTIVE ACTIVITIES AND ADLS
PAIN_FUNCTIONAL_ASSESSMENT: PREVENTS OR INTERFERES SOME ACTIVE ACTIVITIES AND ADLS

## 2020-05-31 ASSESSMENT — PAIN DESCRIPTION - ONSET
ONSET: ON-GOING
ONSET: ON-GOING
ONSET: GRADUAL
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
## 2020-05-31 NOTE — ED NOTES
Bed: 07  Expected date:   Expected time:   Means of arrival:   Comments:  trauma     Tammy Lee RN  05/30/20 [id number]

## 2020-05-31 NOTE — PROGRESS NOTES
General: There is no distension. Palpations: Abdomen is soft. Tenderness: There is no abdominal tenderness. Musculoskeletal:         General: Signs of injury present. Comments: Right shoulder in sling, left hand in splint   Skin:     General: Skin is warm and dry. Neurological:      General: No focal deficit present. Mental Status: He is alert and oriented to person, place, and time. Psychiatric:         Mood and Affect: Mood normal.         Behavior: Behavior normal.         Thought Content: Thought content normal.         Judgment: Judgment normal.         ASSESSMENT/PLAN:  1.  TBi with LOC  --NSGY following  --Keppra x 7 days  2. Post concussive syndrome  --fiorecet for headache  --scopalamine for nausea  3. Right clavicle fracture--s/p ORIF  --NWB  --ortho following  4. Scalp laceration--s/p repair  --LWC  5. Left thumb fracture--splinted  --ortho following    INCIDENTAL FINDINGS:  none    AMPAC:  pending/24    DVT/GI ppx:  Bilateral SCDs/diet    I spent 42 minutes personally with the patient/family/staff/resident(s) in examination, chart and imaging review, discussing natural history and prognosis, differential diagnosis, risks and benefits of treatment and instructions of which more than 50% of the time was spent for counseling and coordinating care.       Silvio López MD, MSc, FACS  5/31/2020  3:56 PM
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
## 2020-05-31 NOTE — PROGRESS NOTES
Admitted to PACU. Pt on monitors, VSS. Aquacel dressing to R clavicle CDI with sling in place. R fingers warm with +2 radial pulse.

## 2020-05-31 NOTE — ED NOTES
Pt logrolled with spinal precautions, no tenderness or trauma noted to c/t/l           Mikal Lala RN  05/30/20 [id number]

## 2020-05-31 NOTE — OP NOTE
we get re-x-rayed most likely put a more permanent brace on it for additional 2 weeks and then transition to something removable to start range of motion. We will also monitor for occult injury.
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
## 2020-05-31 NOTE — ANESTHESIA PRE PROCEDURE
Department of Anesthesiology  Preprocedure Note       Name:  Fadia Matta   Age:  22 y.o.  :  1995                                          MRN:  [id number]         Date:  2020      Surgeon: Chloe Martínez):  Oseas Sidhu MD    Procedure: Procedure(s):  RIGHT CLAVICLE OPEN REDUCTION INTERNAL FIXATION    Medications prior to admission:   Prior to Admission medications    Not on File       Current medications:    Current Facility-Administered Medications   Medication Dose Route Frequency Provider Last Rate Last Dose    ceFAZolin (ANCEF) 2 g in dextrose 3 % 50 mL IVPB (duplex)  2 g Intravenous See Admin Instructions Riley Onofre DO        sodium chloride flush 0.9 % injection 10 mL  10 mL Intravenous 2 times per day Cyndi Thayer MD   10 mL at 20 0749    sodium chloride flush 0.9 % injection 10 mL  10 mL Intravenous PRN Cyndi Thayer MD        acetaminophen (TYLENOL) tablet 650 mg  650 mg Oral Q4H PRN Cyndi Thayer MD   650 mg at 20 0750    magnesium hydroxide (MILK OF MAGNESIA) 400 MG/5ML suspension 30 mL  30 mL Oral Daily PRN Cyndi Thayer MD        promethazine (PHENERGAN) tablet 12.5 mg  12.5 mg Oral Q6H PRN Cyndi Thayer MD        Or    ondansetron [facility]) injection 4 mg  4 mg Intravenous Q6H PRN Cyndi Thayer MD   4 mg at 20 0750    0.9 % sodium chloride infusion   Intravenous Continuous Cyndi Thayer  mL/hr at 20 0102      oxyCODONE-acetaminophen (PERCOCET) 5-325 MG per tablet 1 tablet  1 tablet Oral Q4H PRN Cyndi Thayer MD        Or    oxyCODONE-acetaminophen (PERCOCET) 5-325 MG per tablet 2 tablet  2 tablet Oral Q4H PRN Cyndi Thayer MD        HYDROmorphone (DILAUDID) injection 0.25 mg  0.25 mg Intravenous Q3H PRN Cyndi Thayer MD        Or    HYDROmorphone (DILAUDID) injection 0.5 mg  0.5 mg Intravenous Q3H PRN Cyndi Thayer MD   0.5 mg at 20 0101   
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
## 2020-05-31 NOTE — BRIEF OP NOTE
Brief Postoperative Note      Patient: Jacobo Barrett  YOB: 1995  MRN: [id number]    Date of Procedure: 5/31/2020    Pre-Op Diagnosis: Right Clavicle Fracture    Post-Op Diagnosis: Same       Procedure(s):  RIGHT CLAVICLE OPEN REDUCTION INTERNAL FIXATION    Surgeon(s):  Anne Tom MD    Assistant:  Resident: Guru Mckeon DO; Markie Boyer DO    Anesthesia: General    Estimated Blood Loss (mL): 75    Complications: None    Specimens:   * No specimens in log *    Implants:  Implant Name Type Inv.  Item Serial No.  Lot No. LRB No. Used Action   PLATE LCP CLAVICLE RT 3.5MM W/EXTENSION 7HOLE - [id number] Screw/Plate/Nail/Davion PLATE LCP CLAVICLE RT 3.5MM W/EXTENSION 7HOLE [id number] SYNTHES  Right 1 Implanted   SCREW LK SLFTP W/ T8 2.7X16MM - [id number] Screw/Plate/Nail/Davion SCREW LK SLFTP W/ T8 2.7X16MM 202.216 SYNTHES  Right 4 Implanted   SCREW CORTCL SLFTP FTHRD 2.7X26MM - [id number] Screw/Plate/Nail/Davion SCREW CORTCL SLFTP FTHRD 2.7X26MM 202.826 SYNTHES  Right 1 Implanted   SCREW CORTX SLFTP FTHRD 3.5X20MM - [id number] Screw/Plate/Nail/Davion SCREW CORTX SLFTP FTHRD 3.5X20MM [id number] SYNTHES  Right 1 Implanted   SCREW LK SLFTP W/ 3.5X14MM - [id number] Screw/Plate/Nail/Davion SCREW LK SLFTP W/ 3.5X14MM 212.103 SYNTHES  Right 2 Implanted   SCREW CORTX SLFTP FTHRD 3.5X18MM - [id number] Screw/Plate/Nail/Davion SCREW CORTX SLFTP FTHRD 3.5X18MM 204.818 SYNTHES  Right 2 Implanted         Drains: * No LDAs found *    Findings: see op note    Electronically signed by Markie Boyer DO on 5/31/2020 at 10:43 AM

## 2020-06-01 VITALS
WEIGHT: 230 LBS | BODY MASS INDEX: 34.07 KG/M2 | HEART RATE: 66 BPM | TEMPERATURE: 98.2 F | OXYGEN SATURATION: 98 % | RESPIRATION RATE: 16 BRPM | HEIGHT: 69 IN | SYSTOLIC BLOOD PRESSURE: 114 MMHG | DIASTOLIC BLOOD PRESSURE: 58 MMHG

## 2020-06-01 PROCEDURE — 97161 PT EVAL LOW COMPLEX 20 MIN: CPT

## 2020-06-01 PROCEDURE — 6370000000 HC RX 637 (ALT 250 FOR IP): Performed by: SURGERY

## 2020-06-01 PROCEDURE — 6370000000 HC RX 637 (ALT 250 FOR IP): Performed by: STUDENT IN AN ORGANIZED HEALTH CARE EDUCATION/TRAINING PROGRAM

## 2020-06-01 PROCEDURE — 97530 THERAPEUTIC ACTIVITIES: CPT

## 2020-06-01 PROCEDURE — 2580000003 HC RX 258: Performed by: STUDENT IN AN ORGANIZED HEALTH CARE EDUCATION/TRAINING PROGRAM

## 2020-06-01 PROCEDURE — 97166 OT EVAL MOD COMPLEX 45 MIN: CPT

## 2020-06-01 PROCEDURE — 6360000002 HC RX W HCPCS: Performed by: STUDENT IN AN ORGANIZED HEALTH CARE EDUCATION/TRAINING PROGRAM

## 2020-06-01 PROCEDURE — 99232 SBSQ HOSP IP/OBS MODERATE 35: CPT | Performed by: NEUROLOGICAL SURGERY

## 2020-06-01 PROCEDURE — 99232 SBSQ HOSP IP/OBS MODERATE 35: CPT | Performed by: SURGERY

## 2020-06-01 PROCEDURE — 97535 SELF CARE MNGMENT TRAINING: CPT

## 2020-06-01 RX ORDER — POLYETHYLENE GLYCOL 3350 17 G/17G
17 POWDER, FOR SOLUTION ORAL DAILY
Qty: 527 G | Refills: 1 | Status: SHIPPED | OUTPATIENT
Start: 2020-06-02 | End: 2020-06-12

## 2020-06-01 RX ORDER — OXYCODONE HYDROCHLORIDE AND ACETAMINOPHEN 5; 325 MG/1; MG/1
1 TABLET ORAL EVERY 6 HOURS PRN
Qty: 20 TABLET | Refills: 0 | Status: SHIPPED | OUTPATIENT
Start: 2020-06-01 | End: 2020-06-06

## 2020-06-01 RX ORDER — DIAPER,BRIEF,INFANT-TODD,DISP
EACH MISCELLANEOUS
Qty: 30 G | Refills: 1 | Status: SHIPPED | OUTPATIENT
Start: 2020-06-01 | End: 2020-06-11

## 2020-06-01 RX ORDER — OXYCODONE HYDROCHLORIDE AND ACETAMINOPHEN 5; 325 MG/1; MG/1
1 TABLET ORAL EVERY 6 HOURS PRN
Qty: 20 TABLET | Refills: 0 | Status: SHIPPED | OUTPATIENT
Start: 2020-06-01 | End: 2020-06-01 | Stop reason: SDUPTHER

## 2020-06-01 RX ORDER — LEVETIRACETAM 500 MG/1
500 TABLET ORAL 2 TIMES DAILY
Qty: 12 TABLET | Refills: 0 | Status: SHIPPED | OUTPATIENT
Start: 2020-06-01 | End: 2020-06-12

## 2020-06-01 RX ORDER — BUTALBITAL, ACETAMINOPHEN AND CAFFEINE 50; 325; 40 MG/1; MG/1; MG/1
1 TABLET ORAL EVERY 4 HOURS PRN
Qty: 42 TABLET | Refills: 0 | Status: SHIPPED | OUTPATIENT
Start: 2020-06-01 | End: 2020-06-12

## 2020-06-01 RX ORDER — METHOCARBAMOL 500 MG/1
1000 TABLET, FILM COATED ORAL 4 TIMES DAILY
Qty: 80 TABLET | Refills: 0 | Status: SHIPPED | OUTPATIENT
Start: 2020-06-01 | End: 2020-06-11

## 2020-06-01 RX ADMIN — OXYCODONE HYDROCHLORIDE AND ACETAMINOPHEN 2 TABLET: 5; 325 TABLET ORAL at 09:24

## 2020-06-01 RX ADMIN — BACITRACIN ZINC: 500 OINTMENT TOPICAL at 09:29

## 2020-06-01 RX ADMIN — SODIUM CHLORIDE, PRESERVATIVE FREE 10 ML: 5 INJECTION INTRAVENOUS at 01:04

## 2020-06-01 RX ADMIN — POLYETHYLENE GLYCOL 3350 17 G: 17 POWDER, FOR SOLUTION ORAL at 09:26

## 2020-06-01 RX ADMIN — BACITRACIN ZINC: 500 OINTMENT TOPICAL at 14:22

## 2020-06-01 RX ADMIN — LEVETIRACETAM 500 MG: 500 TABLET ORAL at 09:24

## 2020-06-01 RX ADMIN — METHOCARBAMOL TABLETS 1000 MG: 500 TABLET, COATED ORAL at 09:24

## 2020-06-01 RX ADMIN — OXYCODONE HYDROCHLORIDE AND ACETAMINOPHEN 2 TABLET: 5; 325 TABLET ORAL at 03:51

## 2020-06-01 RX ADMIN — METHOCARBAMOL TABLETS 1000 MG: 500 TABLET, COATED ORAL at 14:20

## 2020-06-01 RX ADMIN — Medication 10 ML: at 10:00

## 2020-06-01 RX ADMIN — CEFAZOLIN SODIUM 2 G: 2 SOLUTION INTRAVENOUS at 01:04

## 2020-06-01 ASSESSMENT — PAIN DESCRIPTION - ONSET: ONSET: GRADUAL

## 2020-06-01 ASSESSMENT — PAIN DESCRIPTION - PAIN TYPE
TYPE: ACUTE PAIN;SURGICAL PAIN
TYPE: ACUTE PAIN

## 2020-06-01 ASSESSMENT — PAIN DESCRIPTION - PROGRESSION: CLINICAL_PROGRESSION: GRADUALLY WORSENING

## 2020-06-01 ASSESSMENT — PAIN SCALES - GENERAL
PAINLEVEL_OUTOF10: 0
PAINLEVEL_OUTOF10: 8
PAINLEVEL_OUTOF10: 3
PAINLEVEL_OUTOF10: 5
PAINLEVEL_OUTOF10: 0

## 2020-06-01 ASSESSMENT — PAIN - FUNCTIONAL ASSESSMENT: PAIN_FUNCTIONAL_ASSESSMENT: PREVENTS OR INTERFERES SOME ACTIVE ACTIVITIES AND ADLS

## 2020-06-01 ASSESSMENT — PAIN DESCRIPTION - FREQUENCY: FREQUENCY: CONTINUOUS

## 2020-06-01 ASSESSMENT — PAIN DESCRIPTION - LOCATION
LOCATION: SHOULDER
LOCATION: SHOULDER

## 2020-06-01 ASSESSMENT — PAIN DESCRIPTION - ORIENTATION
ORIENTATION: RIGHT
ORIENTATION: RIGHT

## 2020-06-01 NOTE — PROGRESS NOTES
(MILK OF MAGNESIA) 400 MG/5ML suspension 30 mL, 30 mL, Oral, Daily PRN  promethazine (PHENERGAN) tablet 12.5 mg, 12.5 mg, Oral, Q6H PRN **OR** ondansetron (ZOFRAN) injection 4 mg, 4 mg, Intravenous, Q6H PRN  oxyCODONE-acetaminophen (PERCOCET) 5-325 MG per tablet 1 tablet, 1 tablet, Oral, Q4H PRN **OR** oxyCODONE-acetaminophen (PERCOCET) 5-325 MG per tablet 2 tablet, 2 tablet, Oral, Q4H PRN  HYDROmorphone (DILAUDID) injection 0.25 mg, 0.25 mg, Intravenous, Q3H PRN **OR** HYDROmorphone (DILAUDID) injection 0.5 mg, 0.5 mg, Intravenous, Q3H PRN  methocarbamol (ROBAXIN) tablet 1,000 mg, 1,000 mg, Oral, 4x Daily  polyethylene glycol (GLYCOLAX) packet 17 g, 17 g, Oral, Daily  sennosides-docusate sodium (SENOKOT-S) 8.6-50 MG tablet 1 tablet, 1 tablet, Oral, BID  bacitracin zinc ointment, , Topical, TID  sodium chloride flush 0.9 % injection 10 mL, 10 mL, Intravenous, 2 times per day  sodium chloride flush 0.9 % injection 10 mL, 10 mL, Intravenous, PRN  levETIRAcetam (KEPPRA) tablet 500 mg, 500 mg, Oral, BID  scopolamine (TRANSDERM-SCOP) transdermal patch 1 patch, 1 patch, Transdermal, Q72H  butalbital-acetaminophen-caffeine (FIORICET, ESGIC) per tablet 1 tablet, 1 tablet, Oral, Q4H PRN    ASSESSMENT:   Left temporal subarachnoid hemorrhage    PLAN:  -No surgical intervention   -Keppra x 7 days  -No anticoagulation or antiplatelet agents   -Follow up in neurosurgery office in 4 weeks with head CT scan       Electronically signed by Jose Adams PA-C on 6/1/2020 at 10:30 AM     I have interviewed and examined the patient and agree with above. No intervention.   F/U in 4 weeks    Cleve Howard
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
## 2020-06-04 ENCOUNTER — TELEPHONE (OUTPATIENT)
Dept: NEUROSURGERY | Age: 25
End: 2020-06-04

## 2020-06-04 ENCOUNTER — TELEPHONE (OUTPATIENT)
Dept: SURGERY | Age: 25
End: 2020-06-04

## 2020-06-04 NOTE — TELEPHONE ENCOUNTER
MA received a call from patients girlfriend, Joyce Steen. She states patient is c/o increased pain in his head, worse than before. She also states the patient is having left side weakness in his leg. MA understood and explained she will need to contact Dr. Christine Bearden office in regards to the head injury, MA provided phone number. MA explained that we will see the patient in Trauma Clinic for the scalp staple removal. MA scheduled staple removal 06/12/2020 @ 2:00pm. Patients girlfriend confirmed date and time. She states she will call Dr. Christine Bearden office now.   Electronically signed by Niraj Herbert on 6/4/20 at 8:15 AM EDT

## 2020-06-05 ENCOUNTER — TELEPHONE (OUTPATIENT)
Dept: SURGERY | Age: 25
End: 2020-06-05

## 2020-06-05 ENCOUNTER — TELEPHONE (OUTPATIENT)
Dept: NEUROSURGERY | Age: 25
End: 2020-06-05

## 2020-06-05 NOTE — TELEPHONE ENCOUNTER
Received a call from [facility], Zachariah Rodriguez, stating patient is in a lot of pain with shoulder 6/10 and head 6/10. States he has one day left of pain medication. She states they are unable to get ahold of Dr. Reilly Julien office. Forwarding to Trauma APN's for further advisement.   Electronically signed by Gian Curtis on 6/5/20 at 1:57 PM EDT

## 2020-06-07 ENCOUNTER — HOSPITAL ENCOUNTER (OUTPATIENT)
Dept: CT IMAGING | Age: 25
Discharge: HOME | End: 2020-06-09
Payer: COMMERCIAL

## 2020-06-07 PROCEDURE — 70450 CT HEAD/BRAIN W/O DYE: CPT

## 2020-06-08 ENCOUNTER — TELEPHONE (OUTPATIENT)
Dept: NEUROSURGERY | Age: 25
End: 2020-06-08

## 2020-06-08 NOTE — TELEPHONE ENCOUNTER
Santi Santana requesting Ct results. Also stating patient is having a lot of pain on the left side of his head.   #  [phone]

## 2020-06-12 ENCOUNTER — OFFICE VISIT (OUTPATIENT)
Dept: SURGERY | Age: 25
End: 2020-06-12
Payer: COMMERCIAL

## 2020-06-12 VITALS
HEART RATE: 83 BPM | BODY MASS INDEX: 35.55 KG/M2 | TEMPERATURE: 98.2 F | OXYGEN SATURATION: 97 % | DIASTOLIC BLOOD PRESSURE: 84 MMHG | RESPIRATION RATE: 16 BRPM | SYSTOLIC BLOOD PRESSURE: 119 MMHG | WEIGHT: 240 LBS | HEIGHT: 69 IN

## 2020-06-12 PROCEDURE — 1111F DSCHRG MED/CURRENT MED MERGE: CPT | Performed by: NURSE PRACTITIONER

## 2020-06-12 PROCEDURE — G8417 CALC BMI ABV UP PARAM F/U: HCPCS | Performed by: NURSE PRACTITIONER

## 2020-06-12 PROCEDURE — 99212 OFFICE O/P EST SF 10 MIN: CPT | Performed by: NURSE PRACTITIONER

## 2020-06-12 PROCEDURE — G8427 DOCREV CUR MEDS BY ELIG CLIN: HCPCS | Performed by: NURSE PRACTITIONER

## 2020-06-12 PROCEDURE — 1036F TOBACCO NON-USER: CPT | Performed by: NURSE PRACTITIONER

## 2020-06-12 RX ORDER — OXYCODONE HYDROCHLORIDE AND ACETAMINOPHEN 5; 325 MG/1; MG/1
TABLET ORAL
COMMUNITY
Start: 2020-06-12 | End: 2020-06-15

## 2020-06-12 RX ORDER — CYCLOBENZAPRINE HCL 10 MG
TABLET ORAL
COMMUNITY
Start: 2020-06-12 | End: 2020-06-15

## 2020-06-12 RX ORDER — ACETAMINOPHEN 325 MG/1
650 TABLET ORAL
COMMUNITY
Start: 2020-05-31

## 2020-06-15 ENCOUNTER — HOSPITAL ENCOUNTER (OUTPATIENT)
Dept: GENERAL RADIOLOGY | Age: 25
Discharge: HOME | End: 2020-06-17
Payer: COMMERCIAL

## 2020-06-15 ENCOUNTER — OFFICE VISIT (OUTPATIENT)
Dept: ORTHOPEDIC SURGERY | Age: 25
End: 2020-06-15
Payer: COMMERCIAL

## 2020-06-15 VITALS
HEIGHT: 69 IN | BODY MASS INDEX: 35.55 KG/M2 | HEART RATE: 78 BPM | WEIGHT: 240 LBS | DIASTOLIC BLOOD PRESSURE: 76 MMHG | SYSTOLIC BLOOD PRESSURE: 129 MMHG

## 2020-06-15 PROCEDURE — 99024 POSTOP FOLLOW-UP VISIT: CPT | Performed by: PHYSICIAN ASSISTANT

## 2020-06-15 PROCEDURE — 73130 X-RAY EXAM OF HAND: CPT

## 2020-06-15 PROCEDURE — 99212 OFFICE O/P EST SF 10 MIN: CPT | Performed by: PHYSICIAN ASSISTANT

## 2020-06-15 PROCEDURE — 73000 X-RAY EXAM OF COLLAR BONE: CPT

## 2020-06-15 PROCEDURE — 29131 APPL FINGER SPLINT DYNAMIC: CPT

## 2020-06-15 RX ORDER — METHOCARBAMOL 500 MG/1
500 TABLET, FILM COATED ORAL 4 TIMES DAILY
Qty: 40 TABLET | Refills: 0 | Status: SHIPPED | OUTPATIENT
Start: 2020-06-15 | End: 2020-06-25

## 2020-06-15 RX ORDER — OXYCODONE HYDROCHLORIDE AND ACETAMINOPHEN 5; 325 MG/1; MG/1
1 TABLET ORAL EVERY 8 HOURS PRN
Qty: 21 TABLET | Refills: 0 | Status: SHIPPED
Start: 2020-06-15 | End: 2020-06-24 | Stop reason: SDUPTHER

## 2020-06-15 RX ORDER — OXYCODONE HYDROCHLORIDE AND ACETAMINOPHEN 5; 325 MG/1; MG/1
TABLET ORAL
Status: CANCELLED | OUTPATIENT
Start: 2020-06-15

## 2020-06-15 NOTE — PATIENT INSTRUCTIONS
Dressing: Can remove dressing in 1-2 days then open to air  Can shower in a couple days, NO Soaking or submerging incision in water until fully healed & all scabs are gone    WB:  Non-weight bearing on right upper extremity  No lifting/gripping with left hand    STAX splint: On at all times to left thumb, can remove for bathing and therapy. Remove daily for evaluation of skin breakdown    Sling on for comfort    Therapy: Start outpatient therapy    No motion of the right shoulder over shoulder height  No lifting/pushing pulling with left shoulder     Pain control : Pain medication refill sent to pharmacy today   Call our office for refill- we are the only ones you should be asking for pain medication from    Continue with ice to the injured extremity 2-3 times per day for swelling  If able continue with elevation and compression    Follow up in 4 weeks with XR of the Right Clavicle and Left Thumb     PT OPTIONS: CALL WHERE YOU WOULD LIKE  "[facility] [employer]  [address]  [address] [place] [address]   [phone]     [facility]  [address] [place] [address] 518 60, [address]  [phone]        Incision and Scar Care    Can remove dressing in 1-2 days then open to air. Can remove Steri Strips after 10 days if they do not fall off on their own. Can shower in a couple days, NO Soaking or submerging incision in water until skin is fully healed. Once your incision has fully healed and no there is no drainage, swelling, redness, or red streaking, you can begin scar massage. Use Vaseline or lotion to perform scar massage several times per day. Massage your incision line to break up any fibrous adhesions and scar tissue. Only massage areas of skin that have fully healed. Do not pull your scabs off. Let them fall off on their own. How much pressure should I apply?   You should apply as much

## 2020-06-24 RX ORDER — OXYCODONE HYDROCHLORIDE AND ACETAMINOPHEN 5; 325 MG/1; MG/1
1 TABLET ORAL EVERY 12 HOURS PRN
Qty: 14 TABLET | Refills: 0 | Status: SHIPPED
Start: 2020-06-24 | End: 2020-07-06 | Stop reason: SDUPTHER

## 2020-06-24 NOTE — TELEPHONE ENCOUNTER
Anabel Ngo is 3.5 weeks from the following Surgery:    DATE OF PROCEDURE:5/31/2020  PROCEDURE:RIGHT CLAVICLE OPEN REDUCTION INTERNAL FIXATION    OAR[place] report reviewed  Last RX filled on 06/15/2020   Plan for wean: weaned to every 12 hours with this RX    Controlled Substance Monitoring:    Acute and Chronic Pain Monitoring:   RX Monitoring 6/24/2020   Periodic Controlled Substance Monitoring No signs of potential drug abuse or diversion identified.         Electronically signed by Karly James PA-C on 6/24/2020 at 11:08 AM

## 2020-07-06 RX ORDER — OXYCODONE HYDROCHLORIDE AND ACETAMINOPHEN 5; 325 MG/1; MG/1
1 TABLET ORAL DAILY PRN
Qty: 7 TABLET | Refills: 0 | Status: SHIPPED | OUTPATIENT
Start: 2020-07-06 | End: 2020-07-13

## 2020-07-06 NOTE — TELEPHONE ENCOUNTER
Kallie Dials is 5 weeks from the following Surgery:    DATE OF PROCEDURE:5/31/2020  PROCEDURE:RIGHT CLAVICLE OPEN REDUCTION INTERNAL FIXATION    OARRS report reviewed  Last RX filled on 06/24/2020   Plan for wean: weaned to daily    Controlled Substance Monitoring:    Acute and Chronic Pain Monitoring:   RX Monitoring 7/6/2020   Periodic Controlled Substance Monitoring No signs of potential drug abuse or diversion identified.         Electronically signed by Iva Eastman PA-C on 7/6/2020 at 3:48 PM

## 2020-07-06 NOTE — TELEPHONE ENCOUNTER
Pt left VM requesting refill of pain medication. DATE OF PROCEDURE:5/31/2020  PROCEDURE:RIGHT CLAVICLE OPEN REDUCTION INTERNAL FIXATION    Order pended and routed for decision and signature.

## 2020-07-30 ENCOUNTER — HOSPITAL ENCOUNTER (EMERGENCY)
Age: 25
Discharge: HOME | End: 2020-07-30
Attending: EMERGENCY MEDICINE
Payer: COMMERCIAL

## 2020-07-30 ENCOUNTER — APPOINTMENT (OUTPATIENT)
Dept: CT IMAGING | Age: 25
End: 2020-07-30
Payer: COMMERCIAL

## 2020-07-30 VITALS
TEMPERATURE: 97.4 F | HEART RATE: 69 BPM | OXYGEN SATURATION: 99 % | RESPIRATION RATE: 16 BRPM | SYSTOLIC BLOOD PRESSURE: 137 MMHG | WEIGHT: 240 LBS | DIASTOLIC BLOOD PRESSURE: 85 MMHG | BODY MASS INDEX: 35.44 KG/M2

## 2020-07-30 LAB
ALBUMIN SERPL-MCNC: 4.3 G/DL (ref 3.5–5.2)
ALP BLD-CCNC: 70 U/L (ref 40–129)
ALT SERPL-CCNC: 32 U/L (ref 0–40)
ANION GAP SERPL CALCULATED.3IONS-SCNC: 11 MMOL/L (ref 7–16)
APTT: 28.4 SEC (ref 24.5–35.1)
AST SERPL-CCNC: 18 U/L (ref 0–39)
BASOPHILS ABSOLUTE: 0.04 E9/L (ref 0–0.2)
BASOPHILS RELATIVE PERCENT: 0.4 % (ref 0–2)
BILIRUB SERPL-MCNC: 0.3 MG/DL (ref 0–1.2)
BUN BLDV-MCNC: 14 MG/DL (ref 6–20)
CALCIUM SERPL-MCNC: 9.5 MG/DL (ref 8.6–10.2)
CHLORIDE BLD-SCNC: 103 MMOL/L (ref 98–107)
CO2: 24 MMOL/L (ref 22–29)
CREAT SERPL-MCNC: 1 MG/DL (ref 0.7–1.2)
EOSINOPHILS ABSOLUTE: 0.15 E9/L (ref 0.05–0.5)
EOSINOPHILS RELATIVE PERCENT: 1.4 % (ref 0–6)
GFR AFRICAN AMERICAN: >60
GFR NON-AFRICAN AMERICAN: >60 ML/MIN/1.73
GLUCOSE BLD-MCNC: 94 MG/DL (ref 74–99)
HCT VFR BLD CALC: 43.9 % (ref 37–54)
HEMOGLOBIN: 14.9 G/DL (ref 12.5–16.5)
IMMATURE GRANULOCYTES #: 0.04 E9/L
IMMATURE GRANULOCYTES %: 0.4 % (ref 0–5)
INR BLD: 1
LYMPHOCYTES ABSOLUTE: 2.86 E9/L (ref 1.5–4)
LYMPHOCYTES RELATIVE PERCENT: 27.4 % (ref 20–42)
MCH RBC QN AUTO: 29.4 PG (ref 26–35)
MCHC RBC AUTO-ENTMCNC: 33.9 % (ref 32–34.5)
MCV RBC AUTO: 86.6 FL (ref 80–99.9)
MONOCYTES ABSOLUTE: 1.03 E9/L (ref 0.1–0.95)
MONOCYTES RELATIVE PERCENT: 9.9 % (ref 2–12)
NEUTROPHILS ABSOLUTE: 6.3 E9/L (ref 1.8–7.3)
NEUTROPHILS RELATIVE PERCENT: 60.5 % (ref 43–80)
PDW BLD-RTO: 12.5 FL (ref 11.5–15)
PLATELET # BLD: 124 E9/L (ref 130–450)
PMV BLD AUTO: 11.1 FL (ref 7–12)
POTASSIUM REFLEX MAGNESIUM: 3.8 MMOL/L (ref 3.5–5)
PROTHROMBIN TIME: 10.9 SEC (ref 9.3–12.4)
RBC # BLD: 5.07 E12/L (ref 3.8–5.8)
SODIUM BLD-SCNC: 138 MMOL/L (ref 132–146)
TOTAL PROTEIN: 7.1 G/DL (ref 6.4–8.3)
WBC # BLD: 10.4 E9/L (ref 4.5–11.5)

## 2020-07-30 PROCEDURE — 85730 THROMBOPLASTIN TIME PARTIAL: CPT

## 2020-07-30 PROCEDURE — 80053 COMPREHEN METABOLIC PANEL: CPT

## 2020-07-30 PROCEDURE — 85610 PROTHROMBIN TIME: CPT

## 2020-07-30 PROCEDURE — 93005 ELECTROCARDIOGRAM TRACING: CPT | Performed by: EMERGENCY MEDICINE

## 2020-07-30 PROCEDURE — 99284 EMERGENCY DEPT VISIT MOD MDM: CPT

## 2020-07-30 PROCEDURE — 85025 COMPLETE CBC W/AUTO DIFF WBC: CPT

## 2020-07-30 PROCEDURE — 6370000000 HC RX 637 (ALT 250 FOR IP): Performed by: EMERGENCY MEDICINE

## 2020-07-30 PROCEDURE — 70450 CT HEAD/BRAIN W/O DYE: CPT

## 2020-07-30 RX ORDER — ONDANSETRON 8 MG/1
8 TABLET, ORALLY DISINTEGRATING ORAL EVERY 8 HOURS PRN
Qty: 12 TABLET | Refills: 1 | Status: SHIPPED | OUTPATIENT
Start: 2020-07-30

## 2020-07-30 RX ORDER — MECLIZINE HYDROCHLORIDE 25 MG/1
25 TABLET ORAL 3 TIMES DAILY PRN
Qty: 15 TABLET | Refills: 0 | Status: SHIPPED | OUTPATIENT
Start: 2020-07-30 | End: 2020-08-09

## 2020-07-30 RX ORDER — MECLIZINE HCL 12.5 MG/1
25 TABLET ORAL ONCE
Status: COMPLETED | OUTPATIENT
Start: 2020-07-30 | End: 2020-07-30

## 2020-07-30 RX ORDER — ONDANSETRON 4 MG/1
4 TABLET, ORALLY DISINTEGRATING ORAL ONCE
Status: COMPLETED | OUTPATIENT
Start: 2020-07-30 | End: 2020-07-30

## 2020-07-30 RX ADMIN — ONDANSETRON 4 MG: 4 TABLET, ORALLY DISINTEGRATING ORAL at 21:48

## 2020-07-30 RX ADMIN — MECLIZINE 25 MG: 12.5 TABLET ORAL at 21:48

## 2020-07-31 LAB
EKG ATRIAL RATE: 80 BPM
EKG P AXIS: 47 DEGREES
EKG P-R INTERVAL: 190 MS
EKG Q-T INTERVAL: 354 MS
EKG QRS DURATION: 90 MS
EKG QTC CALCULATION (BAZETT): 408 MS
EKG R AXIS: 16 DEGREES
EKG T AXIS: 27 DEGREES
EKG VENTRICULAR RATE: 80 BPM
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
## 2020-08-05 ENCOUNTER — TELEPHONE (OUTPATIENT)
Dept: NEUROLOGY | Age: 25
End: 2020-08-05

## 2020-11-10 NOTE — TELEPHONE ENCOUNTER
Vanessa Coronel called, recommended tylenol and caffeine, if no improvement, we will repeat head CT
[phone]

## 2020-12-30 ENCOUNTER — HOSPITAL ENCOUNTER (OUTPATIENT)
Dept: HOSPITAL 83 - LAB | Age: 25
Discharge: HOME | End: 2020-12-30
Attending: FAMILY MEDICINE
Payer: COMMERCIAL

## 2020-12-30 DIAGNOSIS — R10.9: ICD-10-CM

## 2020-12-30 DIAGNOSIS — E78.00: ICD-10-CM

## 2020-12-30 DIAGNOSIS — E55.9: ICD-10-CM

## 2020-12-30 DIAGNOSIS — K21.9: Primary | ICD-10-CM

## 2020-12-30 DIAGNOSIS — F10.10: ICD-10-CM

## 2020-12-30 LAB
25(OH)D3 SERPL-MCNC: 17.2 NG/ML (ref 30–100)
ALBUMIN SERPL-MCNC: 3.9 GM/DL (ref 3.1–4.5)
ALP SERPL-CCNC: 69 U/L (ref 45–117)
ALT SERPL W P-5'-P-CCNC: 67 U/L (ref 12–78)
AST SERPL-CCNC: 25 IU/L (ref 3–35)
BUN SERPL-MCNC: 14 MG/DL (ref 7–24)
CHLORIDE SERPL-SCNC: 104 MMOL/L (ref 98–107)
CHOLEST SERPL-MCNC: 138 MG/DL (ref ?–200)
CREAT SERPL-MCNC: 0.94 MG/DL (ref 0.7–1.3)
ERYTHROCYTE [DISTWIDTH] IN BLOOD BY AUTOMATED COUNT: 12 % (ref 0–14.5)
GGT SERPL-CCNC: 16 U/L (ref 15–85)
HCT VFR BLD AUTO: 46.5 % (ref 42–52)
HDLC SERPL-MCNC: 46 MG/DL (ref 40–60)
LDLC SERPL DIRECT ASSAY-MCNC: 49 MG/DL (ref 9–159)
MCH RBC QN AUTO: 28.5 PG (ref 27–31)
MCHC RBC AUTO-ENTMCNC: 33.5 G/DL (ref 33–37)
MCV RBC AUTO: 84.9 FL (ref 80–94)
NRBC BLD QL AUTO: 0 % (ref 0–0)
PLATELET # BLD AUTO: 71 10*3/UL (ref 130–400)
PMV BLD AUTO: 11.7 FL (ref 9.6–12.3)
POTASSIUM SERPL-SCNC: 3.7 MMOL/L (ref 3.5–5.1)
PROT SERPL-MCNC: 7.5 GM/DL (ref 6.4–8.2)
RBC # BLD AUTO: 5.48 10*6/UL (ref 4.5–5.9)
SODIUM SERPL-SCNC: 139 MMOL/L (ref 136–145)
TRIGL SERPL-MCNC: 216 MG/DL (ref ?–150)
VITAMIN B12: 548 PG/ML (ref 247–911)
VLDLC SERPL CALC-MCNC: 43 MG/DL (ref 6–40)
WBC NRBC COR # BLD AUTO: 10.2 10*3/UL (ref 4.8–10.8)

## 2023-03-10 ENCOUNTER — APPOINTMENT (OUTPATIENT)
Dept: CT IMAGING | Age: 28
End: 2023-03-10

## 2023-03-10 ENCOUNTER — HOSPITAL ENCOUNTER (OUTPATIENT)
Age: 28
Setting detail: OBSERVATION
Discharge: HOME | End: 2023-03-11
Attending: STUDENT IN AN ORGANIZED HEALTH CARE EDUCATION/TRAINING PROGRAM | Admitting: STUDENT IN AN ORGANIZED HEALTH CARE EDUCATION/TRAINING PROGRAM

## 2023-03-10 ENCOUNTER — APPOINTMENT (OUTPATIENT)
Dept: GENERAL RADIOLOGY | Age: 28
End: 2023-03-10

## 2023-03-10 DIAGNOSIS — R07.9 CHEST PAIN, UNSPECIFIED TYPE: Primary | ICD-10-CM

## 2023-03-10 PROBLEM — I24.9 ACS (ACUTE CORONARY SYNDROME) (HCC): Status: ACTIVE | Noted: 2023-03-10

## 2023-03-10 LAB
ALBUMIN SERPL-MCNC: 4.5 G/DL (ref 3.5–5.2)
ALP BLD-CCNC: 60 U/L (ref 40–129)
ALT SERPL-CCNC: 45 U/L (ref 0–40)
ANION GAP SERPL CALCULATED.3IONS-SCNC: 10 MMOL/L (ref 7–16)
AST SERPL-CCNC: 25 U/L (ref 0–39)
BASOPHILS ABSOLUTE: 0.05 E9/L (ref 0–0.2)
BASOPHILS RELATIVE PERCENT: 0.5 % (ref 0–2)
BILIRUB SERPL-MCNC: 0.5 MG/DL (ref 0–1.2)
BUN BLDV-MCNC: 18 MG/DL (ref 6–20)
CALCIUM SERPL-MCNC: 9.4 MG/DL (ref 8.6–10.2)
CHLORIDE BLD-SCNC: 100 MMOL/L (ref 98–107)
CO2: 24 MMOL/L (ref 22–29)
CREAT SERPL-MCNC: 0.9 MG/DL (ref 0.7–1.2)
EOSINOPHILS ABSOLUTE: 0.07 E9/L (ref 0.05–0.5)
EOSINOPHILS RELATIVE PERCENT: 0.7 % (ref 0–6)
GFR SERPL CREATININE-BSD FRML MDRD: >60 ML/MIN/1.73
GLUCOSE BLD-MCNC: 101 MG/DL (ref 74–99)
HCT VFR BLD CALC: 46.7 % (ref 37–54)
HEMOGLOBIN: 15.7 G/DL (ref 12.5–16.5)
IMMATURE GRANULOCYTES #: 0.03 E9/L
IMMATURE GRANULOCYTES %: 0.3 % (ref 0–5)
LIPASE: 57 U/L (ref 13–60)
LYMPHOCYTES ABSOLUTE: 2.45 E9/L (ref 1.5–4)
LYMPHOCYTES RELATIVE PERCENT: 26 % (ref 20–42)
MAGNESIUM: 2 MG/DL (ref 1.6–2.6)
MCH RBC QN AUTO: 28.6 PG (ref 26–35)
MCHC RBC AUTO-ENTMCNC: 33.6 % (ref 32–34.5)
MCV RBC AUTO: 85.1 FL (ref 80–99.9)
MONOCYTES ABSOLUTE: 0.87 E9/L (ref 0.1–0.95)
MONOCYTES RELATIVE PERCENT: 9.2 % (ref 2–12)
NEUTROPHILS ABSOLUTE: 5.95 E9/L (ref 1.8–7.3)
NEUTROPHILS RELATIVE PERCENT: 63.3 % (ref 43–80)
PDW BLD-RTO: 12.4 FL (ref 11.5–15)
PLATELET # BLD: 153 E9/L (ref 130–450)
PMV BLD AUTO: 11 FL (ref 7–12)
POTASSIUM REFLEX MAGNESIUM: 4.1 MMOL/L (ref 3.5–5)
RBC # BLD: 5.49 E12/L (ref 3.8–5.8)
SODIUM BLD-SCNC: 134 MMOL/L (ref 132–146)
TOTAL PROTEIN: 7.3 G/DL (ref 6.4–8.3)
TROPONIN, HIGH SENSITIVITY: 6 NG/L (ref 0–11)
TROPONIN, HIGH SENSITIVITY: 7 NG/L (ref 0–11)
TSH SERPL DL<=0.05 MIU/L-ACNC: 1.11 UIU/ML (ref 0.27–4.2)
WBC # BLD: 9.4 E9/L (ref 4.5–11.5)

## 2023-03-10 PROCEDURE — 83735 ASSAY OF MAGNESIUM: CPT

## 2023-03-10 PROCEDURE — 83690 ASSAY OF LIPASE: CPT

## 2023-03-10 PROCEDURE — 74174 CTA ABD&PLVS W/CONTRAST: CPT

## 2023-03-10 PROCEDURE — 99285 EMERGENCY DEPT VISIT HI MDM: CPT

## 2023-03-10 PROCEDURE — 84443 ASSAY THYROID STIM HORMONE: CPT

## 2023-03-10 PROCEDURE — G0378 HOSPITAL OBSERVATION PER HR: HCPCS

## 2023-03-10 PROCEDURE — 99222 1ST HOSP IP/OBS MODERATE 55: CPT | Performed by: STUDENT IN AN ORGANIZED HEALTH CARE EDUCATION/TRAINING PROGRAM

## 2023-03-10 PROCEDURE — 71275 CT ANGIOGRAPHY CHEST: CPT

## 2023-03-10 PROCEDURE — 93005 ELECTROCARDIOGRAM TRACING: CPT | Performed by: NURSE PRACTITIONER

## 2023-03-10 PROCEDURE — 80053 COMPREHEN METABOLIC PANEL: CPT

## 2023-03-10 PROCEDURE — 85025 COMPLETE CBC W/AUTO DIFF WBC: CPT

## 2023-03-10 PROCEDURE — 36415 COLL VENOUS BLD VENIPUNCTURE: CPT

## 2023-03-10 PROCEDURE — 93005 ELECTROCARDIOGRAM TRACING: CPT | Performed by: STUDENT IN AN ORGANIZED HEALTH CARE EDUCATION/TRAINING PROGRAM

## 2023-03-10 PROCEDURE — 6370000000 HC RX 637 (ALT 250 FOR IP): Performed by: STUDENT IN AN ORGANIZED HEALTH CARE EDUCATION/TRAINING PROGRAM

## 2023-03-10 PROCEDURE — 84484 ASSAY OF TROPONIN QUANT: CPT

## 2023-03-10 PROCEDURE — 71046 X-RAY EXAM CHEST 2 VIEWS: CPT

## 2023-03-10 PROCEDURE — 6360000004 HC RX CONTRAST MEDICATION: Performed by: RADIOLOGY

## 2023-03-10 RX ORDER — SODIUM CHLORIDE 9 MG/ML
INJECTION, SOLUTION INTRAVENOUS PRN
Status: DISCONTINUED | OUTPATIENT
Start: 2023-03-10 | End: 2023-03-11 | Stop reason: HOSPADM

## 2023-03-10 RX ORDER — ASPIRIN 81 MG/1
324 TABLET, CHEWABLE ORAL ONCE
Status: COMPLETED | OUTPATIENT
Start: 2023-03-10 | End: 2023-03-10

## 2023-03-10 RX ORDER — ASPIRIN 81 MG/1
81 TABLET, CHEWABLE ORAL DAILY
Status: DISCONTINUED | OUTPATIENT
Start: 2023-03-11 | End: 2023-03-11 | Stop reason: HOSPADM

## 2023-03-10 RX ORDER — ACETAMINOPHEN 325 MG/1
650 TABLET ORAL EVERY 6 HOURS PRN
Status: DISCONTINUED | OUTPATIENT
Start: 2023-03-10 | End: 2023-03-11 | Stop reason: HOSPADM

## 2023-03-10 RX ORDER — SODIUM CHLORIDE 0.9 % (FLUSH) 0.9 %
5-40 SYRINGE (ML) INJECTION EVERY 12 HOURS SCHEDULED
Status: DISCONTINUED | OUTPATIENT
Start: 2023-03-10 | End: 2023-03-11 | Stop reason: HOSPADM

## 2023-03-10 RX ORDER — ONDANSETRON 4 MG/1
4 TABLET, ORALLY DISINTEGRATING ORAL EVERY 8 HOURS PRN
Status: DISCONTINUED | OUTPATIENT
Start: 2023-03-10 | End: 2023-03-11 | Stop reason: HOSPADM

## 2023-03-10 RX ORDER — ONDANSETRON 2 MG/ML
4 INJECTION INTRAMUSCULAR; INTRAVENOUS EVERY 6 HOURS PRN
Status: DISCONTINUED | OUTPATIENT
Start: 2023-03-10 | End: 2023-03-11 | Stop reason: HOSPADM

## 2023-03-10 RX ORDER — ACETAMINOPHEN 650 MG/1
650 SUPPOSITORY RECTAL EVERY 6 HOURS PRN
Status: DISCONTINUED | OUTPATIENT
Start: 2023-03-10 | End: 2023-03-11 | Stop reason: HOSPADM

## 2023-03-10 RX ORDER — POLYETHYLENE GLYCOL 3350 17 G/17G
17 POWDER, FOR SOLUTION ORAL DAILY PRN
Status: DISCONTINUED | OUTPATIENT
Start: 2023-03-10 | End: 2023-03-11 | Stop reason: HOSPADM

## 2023-03-10 RX ORDER — KETOROLAC TROMETHAMINE 30 MG/ML
30 INJECTION, SOLUTION INTRAMUSCULAR; INTRAVENOUS ONCE
Status: DISCONTINUED | OUTPATIENT
Start: 2023-03-10 | End: 2023-03-10

## 2023-03-10 RX ORDER — ENOXAPARIN SODIUM 100 MG/ML
30 INJECTION SUBCUTANEOUS 2 TIMES DAILY
Status: DISCONTINUED | OUTPATIENT
Start: 2023-03-10 | End: 2023-03-11 | Stop reason: HOSPADM

## 2023-03-10 RX ORDER — ENOXAPARIN SODIUM 100 MG/ML
40 INJECTION SUBCUTANEOUS DAILY
Status: DISCONTINUED | OUTPATIENT
Start: 2023-03-11 | End: 2023-03-10 | Stop reason: DRUGHIGH

## 2023-03-10 RX ORDER — SODIUM CHLORIDE 0.9 % (FLUSH) 0.9 %
5-40 SYRINGE (ML) INJECTION PRN
Status: DISCONTINUED | OUTPATIENT
Start: 2023-03-10 | End: 2023-03-11 | Stop reason: HOSPADM

## 2023-03-10 RX ORDER — ATORVASTATIN CALCIUM 40 MG/1
40 TABLET, FILM COATED ORAL NIGHTLY
Status: DISCONTINUED | OUTPATIENT
Start: 2023-03-10 | End: 2023-03-11 | Stop reason: HOSPADM

## 2023-03-10 RX ADMIN — ASPIRIN 81 MG CHEWABLE TABLET 324 MG: 81 TABLET CHEWABLE at 19:27

## 2023-03-10 RX ADMIN — IOPAMIDOL 90 ML: 755 INJECTION, SOLUTION INTRAVENOUS at 19:53

## 2023-03-10 ASSESSMENT — PAIN DESCRIPTION - ORIENTATION
ORIENTATION: MID

## 2023-03-10 ASSESSMENT — PAIN - FUNCTIONAL ASSESSMENT
PAIN_FUNCTIONAL_ASSESSMENT: 0-10

## 2023-03-10 ASSESSMENT — PAIN SCALES - GENERAL
PAINLEVEL_OUTOF10: 2
PAINLEVEL_OUTOF10: 2
PAINLEVEL_OUTOF10: 1
PAINLEVEL_OUTOF10: 2
PAINLEVEL_OUTOF10: 6

## 2023-03-10 ASSESSMENT — HEART SCORE: ECG: 1

## 2023-03-10 ASSESSMENT — PAIN DESCRIPTION - LOCATION
LOCATION: CHEST

## 2023-03-10 ASSESSMENT — PAIN DESCRIPTION - DESCRIPTORS
DESCRIPTORS: ACHING
DESCRIPTORS: STABBING;PRESSURE

## 2023-03-10 NOTE — ED PROVIDER NOTES
Shared LISA-ED Attending Visit. CC: No         [facility]  ED  Encounter Note  Admit Date/RoomTime: 3/10/2023  6:29 PM  ED Room:   NAME: Sam Brennan  : 1995  MRN: [id number]  PCP: Johana Morales MD    CHIEF COMPLAINT     Chest Pain (Mid sternal chest pain, onset last night nonradiating, left arm tingling)    HISTORY OF PRESENT ILLNESS        Sam Brennan is a 29 y.o. male who presents to the ED private vehicle for complaint of chest pain. Reports last night when he went to lay down to go to sleep he started to feel palpitations as well as a midsternal heaviness that was nonradiating. Lasted several minutes and spontaneously resolved. States that his wife is due with a child he has been under a lot of stress to prepare and today when they went to the hospital in preparation for her delivery on the ride home he had another episode of midsternal chest heaviness. He proceeded to go to the gym where he did a stair climber and since then the heaviness has been constant and he has had a tingling in his left arm. This has been ongoing for several hours now. He has no associated shortness of breath. No pleuritic pain. No dizziness, nausea vomiting or abdominal pain. He has not had any episodes of diaphoresis other than when he was actively exercising. He has never been a smoker of tobacco but he did previously use marijuana. No other drug use. No history of alcohol use. He has no family history of cardiac history. REVIEW OF SYSTEMS     Pertinent positives and negatives are stated within HPI, all other systems reviewed and are negative. Past Medical History:  has a past medical history of Acid reflux and Urinary retention. Surgical History:  has a past surgical history that includes Esophagogastric fundoplication (0072) and Clavicle surgery (Right, 2020). Social History:  reports that he has never smoked.  He has never used smokeless tobacco. He reports current alcohol use. He reports that he does not use drugs. Family History: family history includes High Blood Pressure in his father and mother. Allergies: Patient has no known allergies. CURRENT MEDICATIONS       Previous Medications    ACETAMINOPHEN (TYLENOL) 325 MG TABLET    Take 650 mg by mouth    BUTALBITAL-ACETAMINOPHEN-CAFFEINE (FIORICET, ESGIC) -40 MG PER TABLET    Take 1 tablet by mouth every 4 hours as needed for Headaches    LEVETIRACETAM (KEPPRA) 500 MG TABLET    Take 1 tablet by mouth 2 times daily for 6 days    ONDANSETRON (ZOFRAN ODT) 8 MG TBDP DISINTEGRATING TABLET    Take 1 tablet by mouth every 8 hours as needed for Nausea       SCREENINGS     Fer Coma Scale  Eye Opening: Spontaneous  Best Verbal Response: Oriented  Best Motor Response: Obeys commands  [place] Coma Scale Score: 15    Heart Score for chest pain patients  History: Highly Suspicious  ECG: Non-Specifc repolarization disturbance/LBTB/PM  Patient Age: < 45 years  *Risk factors for Atherosclerotic disease: Obesity  Risk Factors: 1 or 2 risk factors  Troponin: < 1X normal limit  Heart Score Total: 4    CIWA Assessment  BP: 133/82  Heart Rate: 67       PHYSICAL EXAM   Oxygen Saturation Interpretation: Normal on room air analysis. ED Triage Vitals [03/10/23 1820]   BP Temp Temp Source Heart Rate Resp SpO2 Height Weight   (!) 135/100 97.6 °F (36.4 °C) Tympanic 73 16 99 % 5' 9\" (1.753 m) 248 lb (112.5 kg)         Physical Exam  Constitutional/General: Alert and oriented x3, well appearing, non toxic  HEENT:  NC/NT. PERRLA,  Airway patent. Neck: Supple, full ROM, non tender to palpation in the midline, no stridor, no crepitus, no meningeal signs  Respiratory: Lungs clear to auscultation bilaterally, no wheezes, rales, or rhonchi. Not in respiratory distress  CV:  Regular rate. Regular rhythm. No murmurs, gallops, or rubs.  2+ distal pulses  Chest: No chest wall tenderness  GI:  Abdomen Soft, Non tender, Non distended. +BS. No rebound, guarding, or rigidity. No pulsatile masses. Musculoskeletal: Moves all extremities x 4. Warm and well perfused, no clubbing, cyanosis, or edema. Capillary refill <3 seconds  Integument: skin warm and dry. No rashes.    Lymphatic: no lymphadenopathy noted  Neurologic: GCS 15, no focal deficits, symmetric strength 5/5 in the upper and lower extremities bilaterally  Psychiatric: Normal Affect    DIAGNOSTIC RESULTS   (All laboratory and radiology results have been personally reviewed by myself)  Labs:  Results for orders placed or performed during the hospital encounter of 03/10/23   CBC with Auto Differential   Result Value Ref Range    WBC 9.4 4.5 - 11.5 E9/L    RBC 5.49 3.80 - 5.80 E12/L    Hemoglobin 15.7 12.5 - 16.5 g/dL    Hematocrit 46.7 37.0 - 54.0 %    MCV 85.1 80.0 - 99.9 fL    MCH 28.6 26.0 - 35.0 pg    MCHC 33.6 32.0 - 34.5 %    RDW 12.4 11.5 - 15.0 fL    Platelets 270 178 - 185 E9/L    MPV 11.0 7.0 - 12.0 fL    Neutrophils % 63.3 43.0 - 80.0 %    Immature Granulocytes % 0.3 0.0 - 5.0 %    Lymphocytes % 26.0 20.0 - 42.0 %    Monocytes % 9.2 2.0 - 12.0 %    Eosinophils % 0.7 0.0 - 6.0 %    Basophils % 0.5 0.0 - 2.0 %    Neutrophils Absolute 5.95 1.80 - 7.30 E9/L    Immature Granulocytes # 0.03 E9/L    Lymphocytes Absolute 2.45 1.50 - 4.00 E9/L    Monocytes Absolute 0.87 0.10 - 0.95 E9/L    Eosinophils Absolute 0.07 0.05 - 0.50 E9/L    Basophils Absolute 0.05 0.00 - 0.20 E9/L   Comprehensive Metabolic Panel w/ Reflex to MG   Result Value Ref Range    Sodium 134 132 - 146 mmol/L    Potassium reflex Magnesium 4.1 3.5 - 5.0 mmol/L    Chloride 100 98 - 107 mmol/L    CO2 24 22 - 29 mmol/L    Anion Gap 10 7 - 16 mmol/L    Glucose 101 (H) 74 - 99 mg/dL    BUN 18 6 - 20 mg/dL    Creatinine 0.9 0.7 - 1.2 mg/dL    Est, Glom Filt Rate >60 >=60 mL/min/1.73    Calcium 9.4 8.6 - 10.2 mg/dL    Total Protein 7.3 6.4 - 8.3 g/dL    Albumin 4.5 3.5 - 5.2 g/dL    Total Bilirubin 0.5 0.0 - 1.2 mg/dL    Alkaline Phosphatase 60 40 - 129 U/L    ALT 45 (H) 0 - 40 U/L    AST 25 0 - 39 U/L   Lipase   Result Value Ref Range    Lipase 57 13 - 60 U/L   Troponin   Result Value Ref Range    Troponin, High Sensitivity 7 0 - 11 ng/L   Magnesium   Result Value Ref Range    Magnesium 2.0 1.6 - 2.6 mg/dL   TSH   Result Value Ref Range    TSH 1.110 0.270 - 4.200 uIU/mL   Troponin   Result Value Ref Range    Troponin, High Sensitivity 6 0 - 11 ng/L   EKG 12 Lead   Result Value Ref Range    Ventricular Rate 62 BPM    Atrial Rate 62 BPM    P-R Interval 194 ms    QRS Duration 102 ms    Q-T Interval 354 ms    QTc Calculation (Bazett) 359 ms    P Axis 41 degrees    R Axis 2 degrees    T Axis 15 degrees     Imaging: All Radiology results interpreted by Radiologist unless otherwise noted. CTA CHEST W CONTRAST   Final Result   Normal caliber abdominal aorta with no evidence for intramural hematoma or   dissection         CTA ABDOMEN PELVIS W CONTRAST   Final Result   Normal caliber abdominal aorta and iliac arteries with no evidence for   dissection. Colonic fecal retention. Hiatal hernia. Hepatic steatosis. XR CHEST (2 VW)   Final Result   No acute process. ED COURSE   Vitals:    Vitals:    03/10/23 1820 03/10/23 1900 03/10/23 1911 03/10/23 1921   BP: (!) 135/100 (!) 115/94  133/82   Pulse: 73  73 67   Resp: 16  13 19   Temp: 97.6 °F (36.4 °C)      TempSrc: Tympanic      SpO2: 99% 97% 96% 97%   Weight: 248 lb (112.5 kg)      Height: 5' 9\" (1.753 m)          Patient was given the following medications:  Medications   aspirin chewable tablet 324 mg (324 mg Oral Given 3/10/23 1927)   iopamidol (ISOVUE-370) 76 % injection 90 mL (90 mLs IntraVENous Given 3/10/23 1953)       ED Course as of 03/10/23 2126   Fri Mar 10, 2023   1936 Episode of near syncope, dizzy, diaphoresis, nausea. Brought to ed main, placed on monitor. Does report cp improved [LV]   1957 EKG:   This EKG is signed and interpreted by me.    Rate: 62  Rhythm: Sinus  Interpretation: Normal sinus rhythm, normal axis, no acute ST elevations or depressions, normals are normal, QTc is 359  Comparison: no previous EKG available    [KG]   2120 Discussed with Dr. Rosibel Schaefer, will accept obs admission [LV]      ED Course User Index  [KG] Mckenzie Gandara,   [LV] SULEMAN Triplett CNP     PROCEDURES       REASSESSMENT   3/10/23       Time:   Patients condition . CONSULTS:  None  DIFFERENTIAL DX_MDM   MDM:   Social Determinants : None    Records Reviewed : None_ n/a per encounter visit    CC/HPI Summary, DDx, ED Course, and Reassessment: Patient presents with Chest Pain (Mid sternal chest pain, onset last night nonradiating, left arm tingling)  Patient states his symptoms have been intermittent since last night and with exertion while working out at the gym today became significantly worse causing radiation into his left arm and persistent constant pain. He initially denied any family history of cardiac events but then reports that he had a sister die in her 35s of a stroke. He was mildly hypertensive here with a blood pressure of 130/100 and a separate reading of 554 systolic during acute episode of pain but has not followed with a family doctor in years to have any established history. His heart score is a 4 with concerning episode here in the emergency department feel he would benefit from observation admission and likely stress test.  Here 2 troponins are negative, EKG is not demonstrating any evidence of ischemia or ectopy, CTA abdomen pelvis as well as chest not revealing any evidence of a dissection or PE. Have discussed with Dr. Kingsley Rodriguez of the hospitalist service who will admit observation    Plan of Care/Counseling:  SULEMAN Marroquin CNP reviewed today's visit with the patient in addition to providing specific details for the plan of care and counseling regarding the diagnosis and prognosis.   Questions are answered at this time and are agreeable with the plan. ASSESSMENT     1. Chest pain, unspecified type        DISPOSITION   Admits,tele obs  Patient condition is stable    Discharge Instructions:   Patient referred to  No follow-up provider specified. NEW MEDICATIONS     New Prescriptions    No medications on file     Electronically signed by SULEMAN Darby CNP   DD: 3/10/23  **This report was transcribed using voice recognition software. Every effort was made to ensure accuracy; however, inadvertent computerized transcription errors may be present.   END OF ED PROVIDER NOTE      SULEMAN Darby CNP  03/10/23 8872

## 2023-03-11 VITALS
SYSTOLIC BLOOD PRESSURE: 133 MMHG | BODY MASS INDEX: 36.73 KG/M2 | DIASTOLIC BLOOD PRESSURE: 77 MMHG | WEIGHT: 248 LBS | HEART RATE: 72 BPM | OXYGEN SATURATION: 95 % | RESPIRATION RATE: 16 BRPM | HEIGHT: 69 IN | TEMPERATURE: 98 F

## 2023-03-11 LAB
BASOPHILS ABSOLUTE: 0.05 E9/L (ref 0–0.2)
BASOPHILS RELATIVE PERCENT: 0.7 % (ref 0–2)
EKG ATRIAL RATE: 64 BPM
EKG P AXIS: 40 DEGREES
EKG P-R INTERVAL: 202 MS
EKG Q-T INTERVAL: 386 MS
EKG QRS DURATION: 92 MS
EKG QTC CALCULATION (BAZETT): 398 MS
EKG R AXIS: 7 DEGREES
EKG T AXIS: 17 DEGREES
EKG VENTRICULAR RATE: 64 BPM
EOSINOPHILS ABSOLUTE: 0.17 E9/L (ref 0.05–0.5)
EOSINOPHILS RELATIVE PERCENT: 2.4 % (ref 0–6)
HCT VFR BLD CALC: 42.2 % (ref 37–54)
HEMOGLOBIN: 14.1 G/DL (ref 12.5–16.5)
IMMATURE GRANULOCYTES #: 0.01 E9/L
IMMATURE GRANULOCYTES %: 0.1 % (ref 0–5)
LYMPHOCYTES ABSOLUTE: 2.83 E9/L (ref 1.5–4)
LYMPHOCYTES RELATIVE PERCENT: 40.1 % (ref 20–42)
MCH RBC QN AUTO: 28.5 PG (ref 26–35)
MCHC RBC AUTO-ENTMCNC: 33.4 % (ref 32–34.5)
MCV RBC AUTO: 85.4 FL (ref 80–99.9)
MONOCYTES ABSOLUTE: 0.81 E9/L (ref 0.1–0.95)
MONOCYTES RELATIVE PERCENT: 11.5 % (ref 2–12)
NEUTROPHILS ABSOLUTE: 3.19 E9/L (ref 1.8–7.3)
NEUTROPHILS RELATIVE PERCENT: 45.2 % (ref 43–80)
PDW BLD-RTO: 12.5 FL (ref 11.5–15)
PLATELET # BLD: 155 E9/L (ref 130–450)
PMV BLD AUTO: 11.4 FL (ref 7–12)
RBC # BLD: 4.94 E12/L (ref 3.8–5.8)
TROPONIN, HIGH SENSITIVITY: 9 NG/L (ref 0–11)
TROPONIN, HIGH SENSITIVITY: <6 NG/L (ref 0–11)
WBC # BLD: 7.1 E9/L (ref 4.5–11.5)

## 2023-03-11 PROCEDURE — 99239 HOSP IP/OBS DSCHRG MGMT >30: CPT | Performed by: INTERNAL MEDICINE

## 2023-03-11 PROCEDURE — 36415 COLL VENOUS BLD VENIPUNCTURE: CPT

## 2023-03-11 PROCEDURE — 6370000000 HC RX 637 (ALT 250 FOR IP): Performed by: STUDENT IN AN ORGANIZED HEALTH CARE EDUCATION/TRAINING PROGRAM

## 2023-03-11 PROCEDURE — 84484 ASSAY OF TROPONIN QUANT: CPT

## 2023-03-11 PROCEDURE — G0378 HOSPITAL OBSERVATION PER HR: HCPCS

## 2023-03-11 PROCEDURE — 6370000000 HC RX 637 (ALT 250 FOR IP): Performed by: INTERNAL MEDICINE

## 2023-03-11 PROCEDURE — 96372 THER/PROPH/DIAG INJ SC/IM: CPT

## 2023-03-11 PROCEDURE — 93016 CV STRESS TEST SUPVJ ONLY: CPT | Performed by: INTERNAL MEDICINE

## 2023-03-11 PROCEDURE — 6360000002 HC RX W HCPCS: Performed by: STUDENT IN AN ORGANIZED HEALTH CARE EDUCATION/TRAINING PROGRAM

## 2023-03-11 PROCEDURE — 93018 CV STRESS TEST I&R ONLY: CPT | Performed by: INTERNAL MEDICINE

## 2023-03-11 PROCEDURE — 85025 COMPLETE CBC W/AUTO DIFF WBC: CPT

## 2023-03-11 PROCEDURE — 2580000003 HC RX 258: Performed by: STUDENT IN AN ORGANIZED HEALTH CARE EDUCATION/TRAINING PROGRAM

## 2023-03-11 PROCEDURE — 93017 CV STRESS TEST TRACING ONLY: CPT

## 2023-03-11 RX ORDER — PANTOPRAZOLE SODIUM 40 MG/1
40 TABLET, DELAYED RELEASE ORAL
Qty: 30 TABLET | Refills: 1 | Status: SHIPPED | OUTPATIENT
Start: 2023-03-12

## 2023-03-11 RX ORDER — PANTOPRAZOLE SODIUM 40 MG/1
40 TABLET, DELAYED RELEASE ORAL
Status: DISCONTINUED | OUTPATIENT
Start: 2023-03-11 | End: 2023-03-11 | Stop reason: HOSPADM

## 2023-03-11 RX ORDER — OMEPRAZOLE 20 MG/1
40 CAPSULE, DELAYED RELEASE ORAL DAILY
Status: ON HOLD | COMMUNITY
End: 2023-03-11 | Stop reason: HOSPADM

## 2023-03-11 RX ADMIN — PANTOPRAZOLE SODIUM 40 MG: 40 TABLET, DELAYED RELEASE ORAL at 12:35

## 2023-03-11 RX ADMIN — ATORVASTATIN CALCIUM 40 MG: 40 TABLET, FILM COATED ORAL at 01:01

## 2023-03-11 RX ADMIN — ENOXAPARIN SODIUM 30 MG: 100 INJECTION SUBCUTANEOUS at 01:01

## 2023-03-11 RX ADMIN — SODIUM CHLORIDE, PRESERVATIVE FREE 10 ML: 5 INJECTION INTRAVENOUS at 12:35

## 2023-03-11 RX ADMIN — SODIUM CHLORIDE, PRESERVATIVE FREE 10 ML: 5 INJECTION INTRAVENOUS at 01:04

## 2023-03-11 RX ADMIN — ASPIRIN 81 MG CHEWABLE TABLET 81 MG: 81 TABLET CHEWABLE at 12:35

## 2023-03-11 ASSESSMENT — PAIN DESCRIPTION - ORIENTATION
ORIENTATION: MID
ORIENTATION: MID

## 2023-03-11 ASSESSMENT — PAIN DESCRIPTION - DESCRIPTORS
DESCRIPTORS: SORE
DESCRIPTORS: SORE

## 2023-03-11 ASSESSMENT — PAIN DESCRIPTION - LOCATION
LOCATION: CHEST
LOCATION: CHEST

## 2023-03-11 ASSESSMENT — PAIN SCALES - GENERAL
PAINLEVEL_OUTOF10: 1
PAINLEVEL_OUTOF10: 1

## 2023-03-11 NOTE — PLAN OF CARE
Problem: Discharge Planning  Goal: Discharge to home or other facility with appropriate resources  3/11/2023 1305 by Tiago Callejas RN  Outcome: Completed  3/11/2023 0224 by Nuno Navarro RN  Outcome: Progressing     Problem: Pain  Goal: Verbalizes/displays adequate comfort level or baseline comfort level  3/11/2023 1305 by Tiago Callejas RN  Outcome: Completed  3/11/2023 0224 by Nuno Navarro RN  Outcome: Progressing     Problem: Safety - Adult  Goal: Free from fall injury  3/11/2023 1305 by Tiago Callejas RN  Outcome: Completed  3/11/2023 0224 by Nuno Navarro RN  Outcome: Progressing     Problem: ABCDS Injury Assessment  Goal: Absence of physical injury  3/11/2023 1305 by Tiago Callejas RN  Outcome: Completed  3/11/2023 0224 by Nuno Navarro RN  Outcome: Progressing

## 2023-03-11 NOTE — PROGRESS NOTES
Exercise Treadmill Stress Test:    Reason for study:  Chest pain    Resting EKG showed Sinus rhythm    Exam:  Heart - Regular, Lungs - Clear    Exercised on Mathieu protocol for 10 minutes and achieved maximal heart rate of 184, which is 95% of age predicted target heart rate. Achieved 13.2 METS. Duke treadmill score 10    EKG:  No ischemia or arrhythmia during treadmill stress. BP:  Peak /88 mmHg     Symptoms: No chest pain, Mild progressive short of breath, resolved. Post test complications: None      Conclusion: Normal Exercise Treadmill Stress Test - No CP, no EKG ischemia; Achieved 95% of THR,13.2 METS, duke treadmill score of +10    No prior study to compare.       Electronically signed by Redd Agee MD on 3/11/2023 at 11:56 AM

## 2023-03-11 NOTE — H&P
[facility] History and Physical      CHIEF COMPLAINT: Chest pain    History of Present Illness: 77-year-old male with a past medical history of GERD status post Nissen fundoplication, anxiety, and GERD presents emergency department for intermittent chest pain. Patient states that he has been having chest pain for 2 days intermittently worsened with exertion. Patient does admit to several stressors in his life including remodeling his house and expecting new baby in 5 days. Patient stated that when he went to the gym yesterday he felt his chest pain radiating to the left arm. Patient states that happened 2 days ago as well where he was sleeping in bed and felt pressure-like midsternal chest pain. Patient denies nausea, vomiting, diarrhea, shortness of breath, cough. Patient's chest pain has resolved in the ED. Informant(s) for H&P: Patient    REVIEW OF SYSTEMS:  A comprehensive review of systems was negative except for: what is in the HPI      PMH:  Past Medical History:   Diagnosis Date    Acid reflux     Urinary retention        Surgical History:  Past Surgical History:   Procedure Laterality Date    CLAVICLE SURGERY Right 5/31/2020    RIGHT CLAVICLE OPEN REDUCTION INTERNAL FIXATION performed by Kassandra Carrizales MD at [address] [place] [address] FUNDOPLICATION  1299       Medications Prior to Admission:    Prior to Admission medications    Medication Sig Start Date End Date Taking?  Authorizing Provider   ondansetron (ZOFRAN ODT) 8 MG TBDP disintegrating tablet Take 1 tablet by mouth every 8 hours as needed for Nausea 7/30/20   Suresh Hammer MD   acetaminophen (TYLENOL) 325 MG tablet Take 650 mg by mouth 5/31/20   Historical Provider, MD   butalbital-acetaminophen-caffeine (FIORICET, ESGIC) -40 MG per tablet Take 1 tablet by mouth every 4 hours as needed for Headaches 6/1/20 6/12/20  Gianna Paris MD   levETIRAcetam (KEPPRA) 500 MG tablet Take 1 tablet by mouth 2 times daily for 6 days 6/1/20 6/12/20  Luis Alberto Lou MD       Allergies:    Patient has no known allergies. Social History:    reports that he has never smoked. He has never used smokeless tobacco. He reports current alcohol use. He reports that he does not use drugs. Family History:   family history includes High Blood Pressure in his father and mother. PHYSICAL EXAM:  Vitals:  /69   Pulse 59   Temp 97.9 °F (36.6 °C) (Oral)   Resp 13   Ht 5' 9\" (1.753 m)   Wt 248 lb (112.5 kg)   SpO2 97%   BMI 36.62 kg/m²     General Appearance: alert and oriented to person, place and time and in no acute distress  Skin: warm and dry  Head: normocephalic and atraumatic  Eyes: pupils equal, round, and reactive to light, extraocular eye movements intact, conjunctivae normal  Neck: neck supple and non tender without mass   Pulmonary/Chest: clear to auscultation bilaterally- no wheezes, rales or rhonchi, normal air movement, no respiratory distress  Cardiovascular: normal rate, normal S1 and S2 and no carotid bruits  Abdomen: soft, non-tender, non-distended, normal bowel sounds, no masses or organomegaly  Extremities: no cyanosis, no clubbing and no edema  Neurologic: no cranial nerve deficit and speech normal        LABS:  Recent Labs     03/10/23  1847      K 4.1      CO2 24   BUN 18   CREATININE 0.9   GLUCOSE 101*   CALCIUM 9.4       Recent Labs     03/10/23  1847   WBC 9.4   RBC 5.49   HGB 15.7   HCT 46.7   MCV 85.1   MCH 28.6   MCHC 33.6   RDW 12.4      MPV 11.0       No results for input(s): POCGLU in the last 72 hours.     CBC:   Lab Results   Component Value Date/Time    WBC 9.4 03/10/2023 06:47 PM    RBC 5.49 03/10/2023 06:47 PM    HGB 15.7 03/10/2023 06:47 PM    HCT 46.7 03/10/2023 06:47 PM    MCV 85.1 03/10/2023 06:47 PM    MCH 28.6 03/10/2023 06:47 PM    MCHC 33.6 03/10/2023 06:47 PM    RDW 12.4 03/10/2023 06:47 PM     03/10/2023 06:47 PM    MPV 11.0 03/10/2023 06:47 PM BMP:    Lab Results   Component Value Date/Time     03/10/2023 06:47 PM    K 4.1 03/10/2023 06:47 PM     03/10/2023 06:47 PM    CO2 24 03/10/2023 06:47 PM    BUN 18 03/10/2023 06:47 PM    LABALBU 4.5 03/10/2023 06:47 PM    CREATININE 0.9 03/10/2023 06:47 PM    CALCIUM 9.4 03/10/2023 06:47 PM    GFRAA >60 2020 06:16 PM    LABGLOM >60 03/10/2023 06:47 PM    GLUCOSE 101 03/10/2023 06:47 PM     Last 3 Troponin:  No results found for: [address]    Radiology:   CTA CHEST W CONTRAST   Final Result   Normal caliber abdominal aorta with no evidence for intramural hematoma or   dissection         CTA ABDOMEN PELVIS W CONTRAST   Final Result   Normal caliber abdominal aorta and iliac arteries with no evidence for   dissection. Colonic fecal retention. Hiatal hernia. Hepatic steatosis. XR CHEST (2 VW)   Final Result   No acute process. EKG: NSR    ASSESSMENT:      Principal Problem:    Chest pain  Active Problems:    ACS (acute coronary syndrome) (HCC)  Resolved Problems:    * No resolved hospital problems. *      PLAN:    1. ACS rule out-troponin inconclusive. EKG negative for ischemic events. Pending stress test.  Patient midst to father having high blood pressure and sister  from a stroke at the age of 45 resolved. No history of cardiac disease. No history of hypertension or hyperlipidemia. 2.  GERD-continue PPI. \Status post Nissen fundoplication  3. General anxiety disorder-likely related to recent stressors. Consider follow-up outpatient with psychiatry. Not on any antidepressants. Decision to admit to inpatient for further work-up and management. Code Status: Full code  DVT prophylaxis: Lovenox      NOTE: This report was transcribed using voice recognition software. Every effort was made to ensure accuracy; however, inadvertent computerized transcription errors may be present.   Electronically signed by Valdez Kaur MD on 3/10/2023 at 11:13 PM

## 2023-03-11 NOTE — PROGRESS NOTES
Dr. Olga Rhodes MD,    Your patient is on a medication that requires a weight dose adjustment. Body Weight Function Assessment:    Date Body Weight IBW  Adjusted BW SCr  CrCl Dialysis status   3/10/2023 248 lb (112.5 kg)  Ideal body weight: 70.7 kg (155 lb 13.8 oz)  Adjusted ideal body weight: 87.4 kg (192 lb 11.5 oz) Serum creatinine: 0.9 mg/dL 03/10/23 1847  Estimated creatinine clearance: 151 mL/min N/A       Pharmacy has weight dose-adjusted the following medication(s):    Date Previous Order Adjusted Order   3/10/2023 Lovenox 40 mg Sub-Q daily Lovenox 30 mg Sub-Q bid       These changes were made per protocol according to the [facility] Weight Based Dose Adjustment Policy. *Please note this dose may need readjusted if your patient's condition changes. Please contact pharmacy with any questions regarding these changes.     Cayla Brooks, [facility]  3/10/2023  10:01 PM

## 2023-03-11 NOTE — ED NOTES
RN to room 36 to update patient on disposition and care plan. When RN to room, patient is pale, diaphoretic, dizzy, and c/o chest pain that radiates down left arm. Patient states, he doesn't feel well. Patient after about 1 minute states chest pain is relieved, dizziness is no longer present, and he is pink, warm, and dry. Patient placed in ER bed 5 for further evaluation and assessment.      Nathan Johnson RN  03/10/23 [id number]

## 2023-03-11 NOTE — DISCHARGE SUMMARY
[facility] [place] Phys[place] Discharge Summary       No follow-up provider specified. Activity level: As tolerated    Diet: ADULT DIET; Regular    Labs:    Dispo: Home    Condition at discharge: Stable    Continue supplemental oxygen via nasal canula @ 2 LPM round-the-clock. Continue CPAP / BiPAP during sleep as prior to admission. Patient ID:  Daisy López  [id number]  75 y.o.  1995    Admit date: 3/10/2023    Discharge date and time:  3/11/2023  12:52 PM    Admission Diagnoses: Principal Problem:    Chest pain  Active Problems:    ACS (acute coronary syndrome) ([facility]  Resolved Problems:    * No resolved hospital problems. *      Discharge Diagnoses: Principal Problem:    Chest pain  Active Problems:    ACS (acute coronary syndrome) ([facility]  Resolved Problems:    * No resolved hospital problems. *      Consults:  IP CONSULT TO GI    Procedures: Stress test    Hospital Course: Patient was admitted with Chest pain [R07.9]  ACS (acute coronary syndrome) ([facility] [I24.9]  Chest pain, unspecified type [R07.9]. Patient Admitted on 10th, presented with chest pain, patient with known GERD, status post fundoplication, anxiety, non-smoker, does use alcohol, no drug use, admitted for the management of ACS. Troponins negative. On Fioricet and Keppra at home. Here on statins and aspirin. CTA chest negative on presentation for dissection. CT abdomen only showing evidence of constipation. Patient with normal exercise treadmill stress test, achieved 95% of heart rate, 13.2 METS. Initially GI consult was sent for further evaluation of his chest pain which is in turn likely due to GERD. However patient was oriented x3 requested this to be delayed to the outpatient setting as he would like to get out due to the personal family situation. Will add PPI and continue with the DC plans with outpatient GI follow-up.     Discharge Exam:  Vitals:    03/11/23 0015 03/11/23 0415 03/11/23 0830 03/11/23 1051   BP: 138/83 (!) 100/54 123/78 133/77   Pulse: 66 62  72   Resp: 18 16 16 16   Temp: 97.5 °F (36.4 °C) 97.7 °F (36.5 °C) 98.2 °F (36.8 °C) 98 °F (36.7 °C)   TempSrc: Oral  Oral Oral   SpO2: 97% 97% 96% 95%   Weight:       Height:           General Appearance: alert and oriented to person, place and time, well-developed and well-nourished, in no acute distress  Skin: warm and dry, no rash or erythema  Head: normocephalic and atraumatic  Eyes: pupils equal, round, and reactive to light, extraocular eye movements intact, conjunctivae normal  ENT: tympanic membrane, external ear and ear canal normal bilaterally, oropharynx clear and moist with normal mucous membranes  Neck: neck supple and non tender without mass, no thyromegaly or thyroid nodules, no cervical lymphadenopathy   Pulmonary/Chest: clear to auscultation bilaterally- no wheezes, rales or rhonchi, normal air movement, no respiratory distress  Cardiovascular: normal rate, normal S1 and S2, no gallops, intact distal pulses, and no carotid bruits  Abdomen: soft, non-tender, non-distended, normal bowel sounds, no masses or organomegaly  I/O last 3 completed shifts: In: 18 [P.O.:500; I.V.:10]  Out: -   No intake/output data recorded. LABS:  Recent Labs     03/10/23  1847      K 4.1      CO2 24   BUN 18   CREATININE 0.9   GLUCOSE 101*   CALCIUM 9.4       Recent Labs     03/10/23  1847 03/11/23  0420   WBC 9.4 7.1   RBC 5.49 4.94   HGB 15.7 14.1   HCT 46.7 42.2   MCV 85.1 85.4   MCH 28.6 28.5   MCHC 33.6 33.4   RDW 12.4 12.5    155   MPV 11.0 11.4         Imaging:   CTA CHEST W CONTRAST   Final Result   Normal caliber abdominal aorta with no evidence for intramural hematoma or   dissection         CTA ABDOMEN PELVIS W CONTRAST   Final Result   Normal caliber abdominal aorta and iliac arteries with no evidence for   dissection. Colonic fecal retention. Hiatal hernia. Hepatic steatosis.          XR CHEST (2 VW)   Final Result   No acute process. Patient Instructions:      Medication List        START taking these medications      pantoprazole 40 MG tablet  Commonly known as: PROTONIX  Take 1 tablet by mouth every morning (before breakfast)  Start taking on: March 12, 2023            CONTINUE taking these medications      acetaminophen 325 MG tablet  Commonly known as: TYLENOL     butalbital-acetaminophen-caffeine -40 MG per tablet  Commonly known as: FIORICET, ESGIC  Take 1 tablet by mouth every 4 hours as needed for Headaches     levETIRAcetam 500 MG tablet  Commonly known as: KEPPRA  Take 1 tablet by mouth 2 times daily for 6 days            STOP taking these medications      omeprazole 20 MG delayed release capsule  Commonly known as: PRILOSEC            ASK your doctor about these medications      ondansetron 8 MG Tbdp disintegrating tablet  Commonly known as: Zofran ODT  Take 1 tablet by mouth every 8 hours as needed for Nausea               Where to Get Your Medications        These medications were sent to [facility]      Phone: [phone]   pantoprazole 40 MG tablet           Note that more than 30 minutes was spent in preparing discharge papers, discussing discharge with patient, medication review, etc.    Signed:  Electronically signed by Francisco Cosby MD on 3/11/2023 at 12:52 PM    NOTE: This report was transcribed using voice recognition software. Every effort was made to ensure accuracy; however, inadvertent computerized transcription errors may be present.

## 2023-03-11 NOTE — PROGRESS NOTES
Entered patient's room to find him eating from a large [employer] bag. He stated that he had Door Dashed it. Explained to the patient that from this point he is to be NPO for the night until the doctor decides in the AM. Patient is agreeable to this. A&Ox4. Vitals stable. Educated on room and call light. Pupils equal and responsive. Lungs clear. Bowel sounds x4. Pulses equal bilaterally. Strong equal grasps. Equal strength to BLE. Able to ambulate freely in room with no distress on room air. Skin warm and dry. Patient stated that he had chest pain yesterday and it went away. He went to the gym today to not miss a day and is trying to loose weight and the pain came back and caused some tingling in his left arm. Asked the patient if he takes workout supplements. States he takes a 300mg Caffeine pre workout supplement before each workout. Educated patient on the risks. Patient states he hadn't had it when he had the first chest pain. States he feels under a lot of stress buying a house, a business, and his first child is about to be born. Asked him if he is still having chest pain. States he can still notice it but it has greatly decreased from first time. All needs met. Call light within reach.

## 2023-03-11 NOTE — PROGRESS NOTES
[facility] Hospitalist   Progress Note    Admitting Date and Time: 3/10/2023  6:29 PM  Admit Dx: Chest pain [R07.9]  ACS [facility]acute [facility] syndrome[facility] ([facility] [I24.9]  Chest pain, unspecified type [R07.9]    Subjective: Admitted on 10th, presented with chest pain, patient with known GERD, status post fundoplication, anxiety, non-smoker, does use alcohol, no drug use, admitted for the management of ACS. Troponins negative. On Fioricet and Keppra at home. Here on statins and aspirin. CTA chest negative on presentation for dissection. CT abdomen only showing evidence of constipation. Patient was admitted with Chest pain [R07.9]  ACS (acute coronary syndrome) ([facility] [I24.9]  Chest pain, unspecified type [R07.9]. Patient feels somewhat better, at this time awake, alert, sitting in the bed, eating his lunch. Does communicate well. Well oriented. Does say his prior surgery of the fundoplication was done about 7 years ago in similar facility. Daily uses twice a day OTC Prilosec. Sometimes has to take more than 2. Does complain of daily vomiting in the morning. Chest pain is midsternal.  Has gone on diet starting beginning of the year and does do daily hour and 20 minutes of exercise. No reproducible tenderness noted involving chest wall. Per RN: No new issues.     ROS: denies fever, chills, cp, sob, n/v, HA unless stated above.     sodium chloride flush  5-40 mL IntraVENous 2 times per day    aspirin  81 mg Oral Daily    atorvastatin  40 mg Oral Nightly    enoxaparin  30 mg SubCUTAneous BID     sodium chloride flush, 5-40 mL, PRN  sodium chloride, , PRN  ondansetron, 4 mg, Q8H PRN   Or  ondansetron, 4 mg, Q6H PRN  acetaminophen, 650 mg, Q6H PRN   Or  acetaminophen, 650 mg, Q6H PRN  polyethylene glycol, 17 g, Daily PRN         Objective:    /78   Pulse 62   Temp 98.2 °F (36.8 °C) (Oral)   Resp 16   Ht 5' 9\" (1.753 m)   Wt 248 lb (112.5 kg)   SpO2 96%   BMI 36.62 kg/m²   General Appearance: alert and oriented to person, place and time, well-developed and well-nourished, in no acute distress  Skin: warm and dry, no rash or erythema  Head: normocephalic and atraumatic  Eyes: pupils equal, round, and reactive to light, extraocular eye movements intact, conjunctivae normal  ENT: tympanic membrane, external ear and ear canal normal bilaterally, oropharynx clear and moist with normal mucous membranes  Neck: neck supple and non tender without mass, no thyromegaly or thyroid nodules, no cervical lymphadenopathy   Pulmonary/Chest: clear to auscultation bilaterally- no wheezes, rales or rhonchi, normal air movement, no respiratory distress  Cardiovascular: normal rate, normal S1 and S2, no gallops, intact distal pulses, and no carotid bruits  Abdomen: soft, non-tender, non-distended, normal bowel sounds, no masses or organomegaly      Recent Labs     03/10/23  1847      K 4.1      CO2 24   BUN 18   CREATININE 0.9   GLUCOSE 101*   CALCIUM 9.4       Recent Labs     03/10/23  1847 03/11/23  0420   WBC 9.4 7.1   RBC 5.49 4.94   HGB 15.7 14.1   HCT 46.7 42.2   MCV 85.1 85.4   MCH 28.6 28.5   MCHC 33.6 33.4   RDW 12.4 12.5    155   MPV 11.0 11.4       Radiology:   CTA CHEST W CONTRAST   Final Result   Normal caliber abdominal aorta with no evidence for intramural hematoma or   dissection         CTA ABDOMEN PELVIS W CONTRAST   Final Result   Normal caliber abdominal aorta and iliac arteries with no evidence for   dissection. Colonic fecal retention. Hiatal hernia. Hepatic steatosis. XR CHEST (2 VW)   Final Result   No acute process. Assessment:    Principal Problem:    Chest pain  Active Problems:    ACS (acute coronary syndrome) (HCC)  Resolved Problems:    * No resolved hospital problems.  *      Plan:  Chest discomfort, midsternal, current work-up as well as findings on physical exam more suggestive of GI in origin, will consult Dr. Tatum lemons for possible EGD as well as further evaluation of persistent GERD symptoms. Did speak to cardiology, patient okay for DC from cardiac standpoint as he did well on stress test.  Anxiety disorder, controlled  For now we will keep him on PPI.         Electronically signed by Ever Yoon MD on 3/11/2023 at 8:44 AM

## 2023-03-12 LAB
EKG ATRIAL RATE: 62 BPM
EKG ATRIAL RATE: 66 BPM
EKG P AXIS: 41 DEGREES
EKG P AXIS: 45 DEGREES
EKG P-R INTERVAL: 190 MS
EKG P-R INTERVAL: 194 MS
EKG Q-T INTERVAL: 350 MS
EKG Q-T INTERVAL: 354 MS
EKG QRS DURATION: 102 MS
EKG QRS DURATION: 98 MS
EKG QTC CALCULATION (BAZETT): 359 MS
EKG QTC CALCULATION (BAZETT): 366 MS
EKG R AXIS: 10 DEGREES
EKG R AXIS: 2 DEGREES
EKG T AXIS: 15 DEGREES
EKG T AXIS: 25 DEGREES
EKG VENTRICULAR RATE: 62 BPM
EKG VENTRICULAR RATE: 66 BPM

## 2023-03-12 PROCEDURE — 93010 ELECTROCARDIOGRAM REPORT: CPT | Performed by: INTERNAL MEDICINE

## 2023-03-14 LAB
EKG ATRIAL RATE: 64 BPM
EKG P AXIS: 40 DEGREES
EKG P-R INTERVAL: 202 MS
EKG Q-T INTERVAL: 386 MS
EKG QRS DURATION: 92 MS
EKG QTC CALCULATION (BAZETT): 398 MS
EKG R AXIS: 7 DEGREES
EKG T AXIS: 17 DEGREES
EKG VENTRICULAR RATE: 64 BPM

## 2023-06-27 ENCOUNTER — HOSPITAL ENCOUNTER (OUTPATIENT)
Dept: HOSPITAL 83 - LAB | Age: 28
Discharge: HOME | End: 2023-06-27
Attending: FAMILY MEDICINE
Payer: SELF-PAY

## 2023-06-27 DIAGNOSIS — K21.9: ICD-10-CM

## 2023-06-27 DIAGNOSIS — F41.1: ICD-10-CM

## 2023-06-27 DIAGNOSIS — E74.00: ICD-10-CM

## 2023-06-27 DIAGNOSIS — R51.9: ICD-10-CM

## 2023-06-27 DIAGNOSIS — Z00.00: Primary | ICD-10-CM

## 2023-06-27 DIAGNOSIS — E55.9: ICD-10-CM

## 2023-06-27 LAB
25(OH)D3 SERPL-MCNC: 36.5 NG/ML (ref 30–100)
ALP SERPL-CCNC: 71 U/L (ref 46–116)
ALT SERPL W P-5'-P-CCNC: 68 U/L (ref 10–49)
BUN SERPL-MCNC: 14 MG/DL (ref 9–23)
CHLORIDE SERPL-SCNC: 102 MMOL/L (ref 98–107)
CHOLEST SERPL-MCNC: 107 MG/DL (ref ?–200)
ERYTHROCYTE [DISTWIDTH] IN BLOOD BY AUTOMATED COUNT: 12.6 % (ref 0–14.5)
HCT VFR BLD AUTO: 50.1 % (ref 42–52)
LDLC SERPL DIRECT ASSAY-MCNC: 50 MG/DL (ref 9–159)
MCH RBC QN AUTO: 28.9 PG (ref 27–31)
MCHC RBC AUTO-ENTMCNC: 33.9 G/DL (ref 33–37)
MCV RBC AUTO: 85.1 FL (ref 80–94)
NRBC BLD QL AUTO: 0 10*3/UL (ref 0–0)
PLATELET # BLD AUTO: 147 10*3/UL (ref 130–400)
PMV BLD AUTO: 11.3 FL (ref 9.6–12.3)
POTASSIUM SERPL-SCNC: 4.1 MMOL/L (ref 3.4–5.1)
PROT SERPL-MCNC: 7.6 GM/DL (ref 6–8)
RBC # BLD AUTO: 5.89 10*6/UL (ref 4.5–5.9)
T4 FREE SERPL-MCNC: 1.33 NG/DL (ref 0.89–1.76)
TRIGL SERPL-MCNC: 105 MG/DL (ref ?–150)
TSH SERPL DL<=0.005 MIU/L-ACNC: 0.62 UIU/ML (ref 0.55–4.78)
VITAMIN B12: 602 PG/ML (ref 211–911)
WBC NRBC COR # BLD AUTO: 10.4 10*3/UL (ref 4.8–10.8)

## 2025-01-09 ENCOUNTER — APPOINTMENT (OUTPATIENT)
Dept: GENERAL RADIOLOGY | Age: 30
End: 2025-01-09
Payer: COMMERCIAL

## 2025-01-09 ENCOUNTER — HOSPITAL ENCOUNTER (EMERGENCY)
Age: 30
Discharge: HOME | End: 2025-01-09
Payer: COMMERCIAL

## 2025-01-09 VITALS
SYSTOLIC BLOOD PRESSURE: 134 MMHG | RESPIRATION RATE: 16 BRPM | DIASTOLIC BLOOD PRESSURE: 93 MMHG | HEIGHT: 69 IN | WEIGHT: 248 LBS | BODY MASS INDEX: 36.73 KG/M2 | HEART RATE: 64 BPM | OXYGEN SATURATION: 98 % | TEMPERATURE: 97.1 F

## 2025-01-09 DIAGNOSIS — M75.02 ADHESIVE CAPSULITIS OF LEFT SHOULDER: ICD-10-CM

## 2025-01-09 DIAGNOSIS — M77.8 TENDINITIS OF LEFT SHOULDER: Primary | ICD-10-CM

## 2025-01-09 PROCEDURE — 99283 EMERGENCY DEPT VISIT LOW MDM: CPT

## 2025-01-09 PROCEDURE — 6370000000 HC RX 637 (ALT 250 FOR IP): Performed by: PHYSICIAN ASSISTANT

## 2025-01-09 PROCEDURE — 73030 X-RAY EXAM OF SHOULDER: CPT

## 2025-01-09 RX ORDER — HYDROCODONE BITARTRATE AND ACETAMINOPHEN 5; 325 MG/1; MG/1
1 TABLET ORAL ONCE
Status: COMPLETED | OUTPATIENT
Start: 2025-01-09 | End: 2025-01-09

## 2025-01-09 RX ORDER — HYDROCODONE BITARTRATE AND ACETAMINOPHEN 5; 325 MG/1; MG/1
1 TABLET ORAL EVERY 6 HOURS PRN
Qty: 8 TABLET | Refills: 0 | Status: SHIPPED | OUTPATIENT
Start: 2025-01-09 | End: 2025-01-11

## 2025-01-09 RX ORDER — PREDNISONE 20 MG/1
TABLET ORAL
Qty: 18 TABLET | Refills: 0 | Status: SHIPPED | OUTPATIENT
Start: 2025-01-09 | End: 2025-01-19

## 2025-01-09 RX ADMIN — HYDROCODONE BITARTRATE AND ACETAMINOPHEN 1 TABLET: 5; 325 TABLET ORAL at 08:55

## 2025-01-09 NOTE — ED PROVIDER NOTES
this happened 3 weeks ago while hunting lifing himself up from a fall. Than the stiffness went back to normal with no pain till yesterday )    This is a 29-year-old male presents emergency department with left shoulder pain and stiffness.  X-rays done today reveal no acute abnormality.  Patient will be sent home with Norco No. 8 and prednisone and advised to follow-up with orthopedics.  He treated for frozen shoulder.  Patient advised to still use his shoulder.  He will not be given a sling.  He was educated on signs symptoms that would require emergent return to the ED.  Patient was in no distress and able to ambulate at department no difficulty.  Vital stable.    Patient was given the following medications:  Medications   HYDROcodone-acetaminophen (NORCO) 5-325 MG per tablet 1 tablet (1 tablet Oral Given 1/9/25 0855)      Differential diagnoses included but not limited to frozen shoulder, tendinitis, separation, dislocation, fracture    Reassessment:  Patient continues to be non-toxic on re-evaluation.     Chronic Conditions:   Past Medical History:   Diagnosis Date    Acid reflux     Urinary retention      ED COURSE:      Any labs and imaging were reviewed by myself.     Consultations:  None  Discussion with Other Profesionals : None    COUNSELING:   I have spoken with the patient/caregiver and discussed today’s results, in addition to providing specific details for the plan of care and counseling regarding the diagnosis and prognosis and are agreeable with the plan. All results reviewed with pt and all questions answered.  I discussed the differential, results and discharge plan with the patient and/or family/friend/caregiver if present.  I emphasized the importance of follow-up with the physician I referred them to in the timeframe recommended.  I explained reasons for the patient to return to the Emergency Department. Additional verbal discharge instructions were also given and discussed with the patient to

## (undated) DEVICE — GOWN,AURORA,BRTHSLV,2XL,18/CS: Brand: MEDLINE

## (undated) DEVICE — CLOTH SURG PREP PREOPERATIVE CHLORHEXIDINE GLUC 2% READYPREP

## (undated) DEVICE — INTENDED FOR TISSUE SEPARATION, AND OTHER PROCEDURES THAT REQUIRE A SHARP SURGICAL BLADE TO PUNCTURE OR CUT.: Brand: BARD-PARKER ® STAINLESS STEEL BLADES

## (undated) DEVICE — GLOVE ORANGE PI 8   MSG9080

## (undated) DEVICE — PACK,SHOULDER SPLIT: Brand: MEDLINE

## (undated) DEVICE — TOWEL,OR,DSP,ST,BLUE,STD,6/PK,12PK/CS: Brand: MEDLINE

## (undated) DEVICE — GLOVE SURG SZ 8 L12IN FNGR THK79MIL GRN LTX FREE

## (undated) DEVICE — GLOVE ORANGE PI 7   MSG9070

## (undated) DEVICE — ADHESIVE SKIN CLSR 0.7ML TOP DERMBND ADV

## (undated) DEVICE — BLADE CLIPPER GEN PURP NS

## (undated) DEVICE — 3M™ COBAN™ NL STERILE NON-LATEX SELF-ADHERENT WRAP, 2084S, 4 IN X 5 YD (10 CM X 4,5 M), 18 ROLLS/CASE: Brand: 3M™ COBAN™

## (undated) DEVICE — Z DISCONTINUED PER MEDLINE USE 2741942 DRESSING AQUACEL 6 IN ALG W9XL15CM SIL CVR WTRPRF VIR BACT BARR ANTIMIC

## (undated) DEVICE — DRILL SYSTEM 7

## (undated) DEVICE — NEEDLE HYPO 21GA L1.5IN GRN POLYPR HUB S STL REG BVL STR

## (undated) DEVICE — SET ORTHO STD STORTSTD2

## (undated) DEVICE — APPLICATOR MEDICATED 26 CC SOLUTION HI LT ORNG CHLORAPREP

## (undated) DEVICE — GLOVE ORTHO 8   MSG9480

## (undated) DEVICE — GLOVE ORTHO 7 1/2   MSG9475

## (undated) DEVICE — SOLUTION IV IRRIG POUR BRL 0.9% SODIUM CHL 2F7124

## (undated) DEVICE — TOWEL,OR,DSP,ST,BLUE,DLX,10/PK,8PK/CS: Brand: MEDLINE

## (undated) DEVICE — PATIENT RETURN ELECTRODE, SINGLE-USE, CONTACT QUALITY MONITORING, ADULT, WITH 9FT CORD, FOR PATIENTS WEIGING OVER 33LBS. (15KG): Brand: MEGADYNE

## (undated) DEVICE — SURGICAL PROCEDURE PACK BASIC

## (undated) DEVICE — GOWN,BREATHABLE SLV,AURORA,XLG,STRL: Brand: MEDLINE

## (undated) DEVICE — CONVERTORS STOCKINETTE: Brand: CONVERTORS

## (undated) DEVICE — TUBING SUCT 12FR MAL ALUM SHFT FN CAP VENT UNIV CONN W/ OBT

## (undated) DEVICE — SOLUTION IV IRRIG WATER 1000ML POUR BRL 2F7114

## (undated) DEVICE — SET ORTHO STD STORTSTD1

## (undated) DEVICE — DRIP REDUCTION MANIFOLD

## (undated) DEVICE — Z DISCONTINUED USE 2275686 GLOVE SURG SZ 8 L12IN FNGR THK13MIL WHT ISOLEX POLYISOPRENE

## (undated) DEVICE — DRAPE,U/ SHT,SPLIT,PLAS,STERIL: Brand: MEDLINE

## (undated) DEVICE — SYRINGE MED 10ML TRNSLUC BRL PLUNG BLK MRK POLYPR CTRL

## (undated) DEVICE — BIT DRL L125MM DIA2MM S STL QUIK CPL FOR LOK COMPR PLT

## (undated) DEVICE — 1000 S-DRAPE TOWEL DRAPE 10/BX: Brand: STERI-DRAPE™

## (undated) DEVICE — GOWN,BREATHABLE,IMP SLV 3XL AURORA: Brand: MEDLINE

## (undated) DEVICE — DRAPE C ARM W41XL74IN UNIV MOB W RUBBERBAND CLP

## (undated) DEVICE — 3M™ IOBAN™ 2 ANTIMICROBIAL INCISE DRAPE 6650EZ: Brand: IOBAN™ 2

## (undated) DEVICE — BIT DRL L140MM DIA2MM QUIK CPL 3 FLUT CALIB DEPTH MRK W/O

## (undated) DEVICE — BIT DRL L110MM DIA2.5MM ST G QUIK CPL NONRADIOPAQUE W/O STP

## (undated) DEVICE — 3M™ STERI-DRAPE™ U-DRAPE 1015: Brand: STERI-DRAPE™

## (undated) DEVICE — Z DISCONTINUED PER MEDLINE USE 2741943 DRESSING AQUACEL 10 IN ALG W9XL25CM SIL CVR WTRPRF VIR BACT BARR ANTIMIC

## (undated) DEVICE — GLOVE ORANGE PI 7 1/2   MSG9075